# Patient Record
Sex: MALE | Race: WHITE | NOT HISPANIC OR LATINO
[De-identification: names, ages, dates, MRNs, and addresses within clinical notes are randomized per-mention and may not be internally consistent; named-entity substitution may affect disease eponyms.]

---

## 2017-01-03 ENCOUNTER — RX RENEWAL (OUTPATIENT)
Age: 72
End: 2017-01-03

## 2017-06-13 ENCOUNTER — RX RENEWAL (OUTPATIENT)
Age: 72
End: 2017-06-13

## 2017-09-27 ENCOUNTER — RX RENEWAL (OUTPATIENT)
Age: 72
End: 2017-09-27

## 2017-11-05 ENCOUNTER — RX RENEWAL (OUTPATIENT)
Age: 72
End: 2017-11-05

## 2018-02-11 ENCOUNTER — RX RENEWAL (OUTPATIENT)
Age: 73
End: 2018-02-11

## 2018-04-25 ENCOUNTER — RX RENEWAL (OUTPATIENT)
Age: 73
End: 2018-04-25

## 2018-08-05 ENCOUNTER — RX RENEWAL (OUTPATIENT)
Age: 73
End: 2018-08-05

## 2018-10-30 ENCOUNTER — RX RENEWAL (OUTPATIENT)
Age: 73
End: 2018-10-30

## 2021-08-17 ENCOUNTER — APPOINTMENT (OUTPATIENT)
Dept: FAMILY MEDICINE | Facility: CLINIC | Age: 76
End: 2021-08-17
Payer: COMMERCIAL

## 2021-08-17 VITALS
OXYGEN SATURATION: 98 % | TEMPERATURE: 97.3 F | WEIGHT: 268 LBS | BODY MASS INDEX: 35.52 KG/M2 | SYSTOLIC BLOOD PRESSURE: 122 MMHG | HEART RATE: 74 BPM | DIASTOLIC BLOOD PRESSURE: 80 MMHG | HEIGHT: 73 IN

## 2021-08-17 DIAGNOSIS — M48.07 SPINAL STENOSIS, LUMBOSACRAL REGION: ICD-10-CM

## 2021-08-17 DIAGNOSIS — M54.16 RADICULOPATHY, LUMBAR REGION: ICD-10-CM

## 2021-08-17 DIAGNOSIS — J06.9 ACUTE UPPER RESPIRATORY INFECTION, UNSPECIFIED: ICD-10-CM

## 2021-08-17 DIAGNOSIS — G89.29 OTHER CHRONIC PAIN: ICD-10-CM

## 2021-08-17 PROCEDURE — 99204 OFFICE O/P NEW MOD 45 MIN: CPT

## 2021-08-17 RX ORDER — AZITHROMYCIN 250 MG/1
250 TABLET, FILM COATED ORAL
Qty: 6 | Refills: 0 | Status: ACTIVE | COMMUNITY
Start: 2021-08-17 | End: 1900-01-01

## 2021-08-17 NOTE — PHYSICAL EXAM
[Normal] : no respiratory distress, lungs were clear to auscultation bilaterally and no accessory muscle use [de-identified] : post nasal drip [de-identified] : spinal tenderness, positive slr

## 2021-08-17 NOTE — REVIEW OF SYSTEMS
[Postnasal Drip] : postnasal drip [Nasal Discharge] : nasal discharge [Sore Throat] : sore throat [Cough] : cough [Negative] : Neurological [Hearing Loss] : no hearing loss [Nosebleeds] : no nosebleeds [Hoarseness] : no hoarseness [FreeTextEntry6] : non-productive [de-identified] : see hpi [FreeTextEntry9] : see hpi

## 2021-08-17 NOTE — HISTORY OF PRESENT ILLNESS
[FreeTextEntry8] : Patient with severe sciatica, underwent extensive surgery in 2020, which resulted in some relief.\par also has spinal stenosis, has been going to  PT, has gotten epidurals, acupuncture, medical massage, sent by dr. bravo. sees vinod torrez for primary care\par right sided radicular  pain persists. Had left THR a few weeks ago, is doing well with that surgery\par also, has had cough times a week, positive post nasal drip. denies, fever, chills, muscle aches, sob. has had 2 covid shots

## 2021-08-19 LAB — SARS-COV-2 N GENE NPH QL NAA+PROBE: NOT DETECTED

## 2022-08-26 ENCOUNTER — APPOINTMENT (OUTPATIENT)
Dept: ORTHOPEDIC SURGERY | Facility: CLINIC | Age: 77
End: 2022-08-26

## 2022-08-26 VITALS — BODY MASS INDEX: 37.25 KG/M2 | HEIGHT: 72 IN | WEIGHT: 275 LBS

## 2022-08-26 DIAGNOSIS — M11.261 OTHER CHONDROCALCINOSIS, RIGHT KNEE: ICD-10-CM

## 2022-08-26 DIAGNOSIS — M17.11 UNILATERAL PRIMARY OSTEOARTHRITIS, RIGHT KNEE: ICD-10-CM

## 2022-08-26 DIAGNOSIS — M25.461 EFFUSION, RIGHT KNEE: ICD-10-CM

## 2022-08-26 PROCEDURE — 99214 OFFICE O/P EST MOD 30 MIN: CPT

## 2022-08-26 PROCEDURE — 73562 X-RAY EXAM OF KNEE 3: CPT | Mod: RT

## 2022-08-26 NOTE — IMAGING
[de-identified] : \par Left knee exam: \par \par Skin: no significant pertinent finding\par Inspection:  +pitting edema R>L leg\par    mild Effusion\par    (-) Malalignment\par    (-) Swelling\par    (-) Quad atrophy\par    (-) J-sign\par ROM: \par    0 - 120 degrees of flexion.\par Tenderness: \par    (-) MJLT\par    (-) LJLT\par    (-) Medial patellar facet tenderness\par    (-) Lateral patellar facet tenderness\par    (-) Crepitus\par    (-) Patellar grind tenderness\par    (-) Patellar tendon\par    (-) Quad tendon\par    Other: \par Stability: \par    (-) Lachman\par    (-) Varus/Valgus instability\par    (-) Posterior drawer\par    (-) Patellar translation: wnl\par Additional tests: \par    (-) McMurrays test\par    (-) Patellar apprehension\par    Other: \par Strength: foot drop  R \par Vascularity: Extremity warm and well perfused\par Gait: normal.\par \par ------------------------------------------------------------------------------------------------------------------------------------------------------\par  [Right] : right knee [All Views] : anteroposterior, lateral, skyline, and anteroposterior standing [Moderate tricompartmental OA medial narrowing] : Moderate tricompartmental OA medial narrowing [FreeTextEntry9] : chondrocalcinosis, medial joint space narrowing, pf djd.

## 2022-08-26 NOTE — HISTORY OF PRESENT ILLNESS
[Right Leg] : right leg [Gradual] : gradual [0] : 0 [Dull/Aching] : dull/aching [Occasional] : occasional [Stairs] : stairs [de-identified] : This is Mr. LISSY GILLETTE  a 77 year old male who comes in today complaining of right knee pain for a while.  HX of unsucessful lumbar surgery which caused a dropped right foot.  Also had bunionectomy on right foot 6/3/22.  notes he has some recurrent fluid in the right knee at times. pain is not very significant. 'creaking' with stairs. notes he recently had negative doppler\par \par HX of Left TKA in 2014 [] : no [de-identified] : urgent care

## 2022-08-26 NOTE — DISCUSSION/SUMMARY
[de-identified] : clinically asymptomatic knee effusion from OA and pseudogout. discused options. will observe for now as he does not have any significant dysfunction. \par \par ------------------------------------------------------------------------------------------------------------------------------------------------------\par \par The patient was advised of the diagnosis.  The natural history of the pathology was explained in full. All questions were answered.  The risks and benefits of conservative and interventional treatment alternatives were explained to the patient\par \par ------------------------------------------------------------------------------------------------------------------------------------------------------\par

## 2022-10-24 ENCOUNTER — APPOINTMENT (OUTPATIENT)
Dept: ORTHOPEDIC SURGERY | Facility: CLINIC | Age: 77
End: 2022-10-24

## 2022-10-24 VITALS — BODY MASS INDEX: 35.78 KG/M2 | HEIGHT: 73 IN | WEIGHT: 270 LBS

## 2022-10-24 DIAGNOSIS — E11.9 TYPE 2 DIABETES MELLITUS W/OUT COMPLICATIONS: ICD-10-CM

## 2022-10-24 DIAGNOSIS — G62.9 POLYNEUROPATHY, UNSPECIFIED: ICD-10-CM

## 2022-10-24 DIAGNOSIS — Z00.00 ENCOUNTER FOR GENERAL ADULT MEDICAL EXAMINATION W/OUT ABNORMAL FINDINGS: ICD-10-CM

## 2022-10-24 DIAGNOSIS — E78.00 PURE HYPERCHOLESTEROLEMIA, UNSPECIFIED: ICD-10-CM

## 2022-10-24 DIAGNOSIS — Z87.39 PERSONAL HISTORY OF OTHER DISEASES OF THE MUSCULOSKELETAL SYSTEM AND CONNECTIVE TISSUE: ICD-10-CM

## 2022-10-24 DIAGNOSIS — I10 ESSENTIAL (PRIMARY) HYPERTENSION: ICD-10-CM

## 2022-10-24 DIAGNOSIS — R29.898 OTHER SYMPTOMS AND SIGNS INVOLVING THE MUSCULOSKELETAL SYSTEM: ICD-10-CM

## 2022-10-24 PROCEDURE — 73630 X-RAY EXAM OF FOOT: CPT | Mod: LT

## 2022-10-24 PROCEDURE — 99214 OFFICE O/P EST MOD 30 MIN: CPT

## 2022-10-24 NOTE — HISTORY OF PRESENT ILLNESS
[de-identified] : Patient presents for evaluation of his LT foot/3rd toe. He has a history of neuropathy. Denies trauma. Symptoms since July/August 2022. He has a history of right ankle/foot OA and right foot drop (secondary to lumbar surgery). He wears a dorsiflex assist AFO interchangeably with an OTC brace depending on shoes he uses. Ambulating with crutches due to chronic lumbar spine issue. He reports burning sensation especially in his third toe, chiefly at night.

## 2022-10-24 NOTE — ASSESSMENT
[FreeTextEntry1] : His symptoms seem to be consistent with neuropathy.\par \par He is currently taking Gabapentin as prescribed by his nephrologist. He sees them tomorrow and will discuss this further.

## 2022-10-24 NOTE — PHYSICAL EXAM
[NL (40)] : plantar flexion 40 degrees [NL 30)] : inversion 30 degrees [5___] : [facility] 5[unfilled]/5 [2+] : posterior tibialis pulse: 2+ [2nd] : 2nd [3rd] : 3rd [Moderate] : moderate diffused ankle swelling [4___] : eversion 4[unfilled]/5 [Decreased] : saphenous nerve sensation decreased [] : no pain when stressing lateral tarsal metatarsal joint [Left] : left foot [Weight -] : weightbearing [FreeTextEntry3] : PItting edema of lower leg and ankle. Lateral deviation 2nd/3rd toes.  [de-identified] : Hallux valgus, hammer toes and lateral deviation 2nd/3rd toes.  [de-identified] : eversion 15 degrees [TWNoteComboBox7] : dorsiflexion 15 degrees

## 2023-09-26 ENCOUNTER — APPOINTMENT (OUTPATIENT)
Dept: ENDOCRINOLOGY | Facility: CLINIC | Age: 78
End: 2023-09-26
Payer: COMMERCIAL

## 2023-09-26 VITALS
OXYGEN SATURATION: 99 % | TEMPERATURE: 97.4 F | SYSTOLIC BLOOD PRESSURE: 128 MMHG | WEIGHT: 301.06 LBS | BODY MASS INDEX: 39.9 KG/M2 | HEART RATE: 72 BPM | HEIGHT: 73 IN | DIASTOLIC BLOOD PRESSURE: 72 MMHG

## 2023-09-26 LAB
GLUCOSE BLDC GLUCOMTR-MCNC: 364
HBA1C MFR BLD HPLC: 8.8

## 2023-09-26 PROCEDURE — 83036 HEMOGLOBIN GLYCOSYLATED A1C: CPT | Mod: QW

## 2023-09-26 PROCEDURE — 82962 GLUCOSE BLOOD TEST: CPT

## 2023-09-26 PROCEDURE — 99204 OFFICE O/P NEW MOD 45 MIN: CPT

## 2023-09-26 RX ORDER — GLUCOSAM/CHON-MSM1/C/MANG/BOSW 500-416.6
TABLET ORAL
Qty: 1 | Refills: 0 | Status: ACTIVE | COMMUNITY
Start: 2023-09-26 | End: 1900-01-01

## 2023-09-26 RX ORDER — BLOOD-GLUCOSE METER
70 EACH MISCELLANEOUS
Qty: 3 | Refills: 3 | Status: ACTIVE | COMMUNITY
Start: 2023-09-26 | End: 1900-01-01

## 2023-09-26 RX ORDER — BLOOD SUGAR DIAGNOSTIC
STRIP MISCELLANEOUS
Qty: 270 | Refills: 2 | Status: ACTIVE | COMMUNITY
Start: 2023-09-26 | End: 1900-01-01

## 2023-09-26 RX ORDER — LANCETS 33 GAUGE
EACH MISCELLANEOUS
Qty: 360 | Refills: 2 | Status: ACTIVE | COMMUNITY
Start: 2023-09-26 | End: 1900-01-01

## 2023-09-26 RX ORDER — PEN NEEDLE, DIABETIC 29 G X1/2"
32G X 4 MM NEEDLE, DISPOSABLE MISCELLANEOUS
Qty: 4 | Refills: 2 | Status: ACTIVE | COMMUNITY
Start: 2023-09-26 | End: 1900-01-01

## 2023-12-12 ENCOUNTER — APPOINTMENT (OUTPATIENT)
Dept: ENDOCRINOLOGY | Facility: CLINIC | Age: 78
End: 2023-12-12
Payer: COMMERCIAL

## 2023-12-12 ENCOUNTER — RESULT CHARGE (OUTPATIENT)
Age: 78
End: 2023-12-12

## 2023-12-12 VITALS
RESPIRATION RATE: 16 BRPM | TEMPERATURE: 98 F | DIASTOLIC BLOOD PRESSURE: 64 MMHG | OXYGEN SATURATION: 97 % | HEIGHT: 73 IN | SYSTOLIC BLOOD PRESSURE: 118 MMHG | WEIGHT: 276 LBS | HEART RATE: 69 BPM | BODY MASS INDEX: 36.58 KG/M2

## 2023-12-12 DIAGNOSIS — Z79.4 LONG TERM (CURRENT) USE OF INSULIN: ICD-10-CM

## 2023-12-12 DIAGNOSIS — E66.9 OBESITY, UNSPECIFIED: ICD-10-CM

## 2023-12-12 DIAGNOSIS — E11.9 TYPE 2 DIABETES MELLITUS W/OUT COMPLICATIONS: ICD-10-CM

## 2023-12-12 LAB
GLUCOSE BLDC GLUCOMTR-MCNC: 89
HBA1C MFR BLD HPLC: 6.6

## 2023-12-12 PROCEDURE — 99214 OFFICE O/P EST MOD 30 MIN: CPT

## 2024-01-15 ENCOUNTER — RX RENEWAL (OUTPATIENT)
Age: 79
End: 2024-01-15

## 2024-01-16 ENCOUNTER — NON-APPOINTMENT (OUTPATIENT)
Age: 79
End: 2024-01-16

## 2024-04-06 ENCOUNTER — EMERGENCY (EMERGENCY)
Facility: HOSPITAL | Age: 79
LOS: 1 days | Discharge: HOME | End: 2024-04-06
Attending: EMERGENCY MEDICINE
Payer: COMMERCIAL

## 2024-04-06 VITALS
HEART RATE: 56 BPM | DIASTOLIC BLOOD PRESSURE: 78 MMHG | SYSTOLIC BLOOD PRESSURE: 150 MMHG | TEMPERATURE: 98 F | HEIGHT: 77 IN | WEIGHT: 265 LBS | RESPIRATION RATE: 18 BRPM | OXYGEN SATURATION: 97 %

## 2024-04-06 VITALS
SYSTOLIC BLOOD PRESSURE: 152 MMHG | DIASTOLIC BLOOD PRESSURE: 67 MMHG | HEART RATE: 68 BPM | OXYGEN SATURATION: 98 % | TEMPERATURE: 99 F | RESPIRATION RATE: 18 BRPM

## 2024-04-06 PROCEDURE — 99283 EMERGENCY DEPT VISIT LOW MDM: CPT

## 2024-04-06 PROCEDURE — 99284 EMERGENCY DEPT VISIT MOD MDM: CPT

## 2024-04-06 RX ORDER — ERYTHROMYCIN BASE 5 MG/GRAM
1 OINTMENT (GRAM) OPHTHALMIC (EYE)
Qty: 1 | Refills: 0
Start: 2024-04-06 | End: 2024-04-12

## 2024-04-06 RX ORDER — ERYTHROMYCIN BASE 5 MG/GRAM
1 OINTMENT (GRAM) OPHTHALMIC (EYE) ONCE
Refills: 0 | Status: COMPLETED | OUTPATIENT
Start: 2024-04-06 | End: 2024-04-06

## 2024-04-06 RX ADMIN — Medication 1 APPLICATION(S): at 18:54

## 2024-04-06 NOTE — ED PROVIDER NOTE - PROGRESS NOTE DETAILS
Adriel SCOTT), PGY-1: spoke with ophthalmology, discussed clinical presentation and exam, sent photo of eye with permission from patient, ophthalmology recommends outpatient follow up within 1wk for likely subconjunctival hemorrhage, recommending erythromycin ointment and artificial tears.

## 2024-04-06 NOTE — ED ADULT NURSE NOTE - OBJECTIVE STATEMENT
Pt 78 year old male, A/O x4. Pt came in due ot left eye bleeding. PMH- Afib (takes Xarelto), HTN, DM2, cataracts, right eye completely blind. . As per pt, he noticed bleeding yesterday. Unknown how bleeding occurred. Upon assessment, eye currently bleeding an dpt continues to dab area with tissue with residual blood. Denies ha, dizziness, vision changes, pain, n/v/d, numbness/ tingling.

## 2024-04-06 NOTE — ED PROVIDER NOTE - PHYSICAL EXAMINATION
Const: Awake, alert, no acute distress.  Well appearing.  Moving comfortably on stretcher.  HEENT: NC/AT.  Moist mucous membranes.  Eyes: L eye with intact extraocular movements, pupil 3mm and reactive to light, no hyphema, area of subconjunctival hemorrhage to upper nasal conjunctiva.  L eye visual acuity 20/40.  No abrasions seen on woods lamp examination.  No scleral icterus.  R eye with decreased ROM and blindness (chronic).  Neck: Full ROM without pain.  Cardiac: Extremities well perfused, normal coloration, no peripheral cyanosis.  No peripheral edema.  S1 S2 present.  Resp: Speaking in full sentences.  No evidence of respiratory distress.  breath sounds clear to auscultation b/l.  Abd: Non-distended.  Skin: Normal coloration.  No rashes, abrasions or lacerations.  Neuro: Awake, alert & oriented x 3.  Moves all extremities symmetrically.  No obvious focal deficits.

## 2024-04-06 NOTE — ED ADULT TRIAGE NOTE - CHIEF COMPLAINT QUOTE
"I noticed yesterday that my left eye is bleeding. I am not sure if I scratched my eyelid but it is still bleeding since yesterday. I went to urgent care and they told me to come here"  not active bleeding at this moment

## 2024-04-06 NOTE — ED PROVIDER NOTE - OBJECTIVE STATEMENT
78-year-old M patient with history HTN, HLD, DM on insulin, A-fib on Xarelto and verapamil, presenting to the ED for evaluation of left eye bleeding since last night.  Patient states last night at approximately 5 PM he noticed tears of blood coming from his left eye, and has had bleeding since then.  No recent eye trauma or falls or injuries.  Denies fevers, chills, blurred vision, eye pain, nausea, vomiting, diarrhea, headaches, numbness, tingling, ear pain, tinnitus, nasal congestion, cough, throat pain.

## 2024-04-06 NOTE — ED ADULT NURSE NOTE - CHPI ED NUR SYMPTOMS NEG
no blurred vision/no double vision/no eye lid swelling/no foreign body/no itching/no pain/no photophobia/no purulent drainage

## 2024-04-06 NOTE — ED PROVIDER NOTE - CLINICAL SUMMARY MEDICAL DECISION MAKING FREE TEXT BOX
Yovany: 78 year old male with pmhx of afib on xarelto, blind in R eye, here with bleeding from left eye yesterday. no scratches, started at evening. wipes it and reaccumulates to bottom eye.  no tears.   PE: alert, nad, nonlabored respirations, + s1s2.left eye PEERL, left eomi, + 11'oclock subconjuctival hemorrhage producing bleeding tears (not from lacrimal duct), blind in right eye, left eye 20/50, fluroscein uptake at the hemorrhage, no FU in cornea, no hyphema, alert and oriented x 3, no focal deficits, normal gait.

## 2024-04-06 NOTE — ED PROVIDER NOTE - PATIENT PORTAL LINK FT
You can access the FollowMyHealth Patient Portal offered by [facility] by registering at the following website: [email or web address]. By joining [employer]’s FollowMyHealth portal, you will also be able to view your health information using other applications (apps) compatible with our system.

## 2024-04-06 NOTE — ED PROVIDER NOTE - NSFOLLOWUPINSTRUCTIONS_ED_ALL_ED_FT
You were seen in the emergency department for evaluation of bleeding from the left eye, we examined the eye, and sent a prescription to your pharmacy.    We sent a prescription for erythromycin ointment (antibiotic) to your pharmacy, please apply this cream to the left thigh once a day at night for the next 7 days.  Additionally, please use artificial tears several times throughout the day to lubricate the eye.    As discussed, please follow-up with your ophthalmologist for continued evaluation of subconjunctival hemorrhage.  If you have any of the following symptoms please return to the emergency department immediately:  blurred vision, loss of vision, flashes or floaters in the visual field, pain in the eye, pain with movement of the eye, fevers, chills, nausea, vomiting.    Please RETURN TO THE EMERGENCY DEPARTMENT OR SEEK IMMEDIATE MEDICAL ATTENTION if you experience any new or worsening symptoms, including but not limited to: fevers, chills, persistent nausea or vomiting or diarrhea, significant fatigue, significantly decreased physical activity, inability to stand or walk on your own, inability to hold down foods or fluids, fainting, chest pain, shortness of breath, bloody urine, coughing up or throwing up blood, bloody stools.

## 2024-04-06 NOTE — ED ADULT NURSE NOTE - NSFALLUNIVINTERV_ED_ALL_ED
Bed/Stretcher in lowest position, wheels locked, appropriate side rails in place/Call bell, personal items and telephone in reach/Instruct patient to call for assistance before getting out of bed/chair/stretcher/Non-slip footwear applied when patient is off stretcher/Jay Em to call system/Physically safe environment - no spills, clutter or unnecessary equipment/Purposeful proactive rounding/Room/bathroom lighting operational, light cord in reach

## 2024-04-12 ENCOUNTER — APPOINTMENT (OUTPATIENT)
Dept: ENDOCRINOLOGY | Facility: CLINIC | Age: 79
End: 2024-04-12

## 2024-04-22 RX ORDER — SEMAGLUTIDE 1.34 MG/ML
4 INJECTION, SOLUTION SUBCUTANEOUS
Qty: 3 | Refills: 0 | Status: ACTIVE | COMMUNITY
Start: 2023-09-26 | End: 1900-01-01

## 2024-07-08 ENCOUNTER — APPOINTMENT (OUTPATIENT)
Dept: ENDOCRINOLOGY | Facility: CLINIC | Age: 79
End: 2024-07-08
Payer: COMMERCIAL

## 2024-07-08 VITALS
WEIGHT: 270.5 LBS | BODY MASS INDEX: 35.85 KG/M2 | HEIGHT: 73 IN | HEART RATE: 72 BPM | DIASTOLIC BLOOD PRESSURE: 78 MMHG | SYSTOLIC BLOOD PRESSURE: 123 MMHG | OXYGEN SATURATION: 98 %

## 2024-07-08 DIAGNOSIS — Z79.4 LONG TERM (CURRENT) USE OF INSULIN: ICD-10-CM

## 2024-07-08 DIAGNOSIS — E66.9 OBESITY, UNSPECIFIED: ICD-10-CM

## 2024-07-08 DIAGNOSIS — E11.9 TYPE 2 DIABETES MELLITUS W/OUT COMPLICATIONS: ICD-10-CM

## 2024-07-08 LAB
GLUCOSE BLDC GLUCOMTR-MCNC: 85
HBA1C MFR BLD HPLC: 6.5

## 2024-07-08 PROCEDURE — 99214 OFFICE O/P EST MOD 30 MIN: CPT

## 2024-07-08 PROCEDURE — 95250 CONT GLUC MNTR PHYS/QHP EQP: CPT

## 2024-07-08 PROCEDURE — 83036 HEMOGLOBIN GLYCOSYLATED A1C: CPT | Mod: QW

## 2024-07-08 RX ORDER — BLOOD-GLUCOSE SENSOR
EACH MISCELLANEOUS
Qty: 9 | Refills: 3 | Status: ACTIVE | COMMUNITY
Start: 2024-07-08 | End: 1900-01-01

## 2024-11-16 ENCOUNTER — INPATIENT (INPATIENT)
Facility: HOSPITAL | Age: 79
LOS: 1 days | Discharge: HOME | End: 2024-11-18
Attending: HOSPITALIST | Admitting: HOSPITALIST
Payer: COMMERCIAL

## 2024-11-16 VITALS — RESPIRATION RATE: 19 BRPM | WEIGHT: 274.92 LBS | TEMPERATURE: 98 F | HEIGHT: 73 IN

## 2024-11-16 LAB
ALBUMIN SERPL ELPH-MCNC: 2.8 G/DL — LOW (ref 3.3–5)
ALP SERPL-CCNC: 43 U/L — SIGNIFICANT CHANGE UP (ref 40–120)
ALT FLD-CCNC: 25 U/L — SIGNIFICANT CHANGE UP (ref 12–78)
ANION GAP SERPL CALC-SCNC: 5 MMOL/L — SIGNIFICANT CHANGE UP (ref 5–17)
APTT BLD: 40.6 SEC — HIGH (ref 24.5–35.6)
AST SERPL-CCNC: 37 U/L — SIGNIFICANT CHANGE UP (ref 15–37)
BASOPHILS # BLD AUTO: 0.05 K/UL — SIGNIFICANT CHANGE UP (ref 0–0.2)
BASOPHILS NFR BLD AUTO: 0.8 % — SIGNIFICANT CHANGE UP (ref 0–2)
BILIRUB SERPL-MCNC: 0.7 MG/DL — SIGNIFICANT CHANGE UP (ref 0.2–1.2)
BUN SERPL-MCNC: 69 MG/DL — HIGH (ref 7–23)
CALCIUM SERPL-MCNC: 8.5 MG/DL — SIGNIFICANT CHANGE UP (ref 8.5–10.1)
CHLORIDE SERPL-SCNC: 112 MMOL/L — HIGH (ref 96–108)
CO2 SERPL-SCNC: 26 MMOL/L — SIGNIFICANT CHANGE UP (ref 22–31)
CREAT SERPL-MCNC: 4.07 MG/DL — HIGH (ref 0.5–1.3)
EGFR: 14 ML/MIN/1.73M2 — LOW
EOSINOPHIL # BLD AUTO: 0.18 K/UL — SIGNIFICANT CHANGE UP (ref 0–0.5)
EOSINOPHIL NFR BLD AUTO: 2.8 % — SIGNIFICANT CHANGE UP (ref 0–6)
FLUAV AG NPH QL: SIGNIFICANT CHANGE UP
FLUBV AG NPH QL: SIGNIFICANT CHANGE UP
GLUCOSE BLDC GLUCOMTR-MCNC: 116 MG/DL — HIGH (ref 70–99)
GLUCOSE SERPL-MCNC: 153 MG/DL — HIGH (ref 70–99)
HCT VFR BLD CALC: 32.2 % — LOW (ref 39–50)
HGB BLD-MCNC: 10.3 G/DL — LOW (ref 13–17)
IMM GRANULOCYTES NFR BLD AUTO: 0.3 % — SIGNIFICANT CHANGE UP (ref 0–0.9)
INR BLD: 1.54 RATIO — HIGH (ref 0.85–1.16)
LYMPHOCYTES # BLD AUTO: 0.77 K/UL — LOW (ref 1–3.3)
LYMPHOCYTES # BLD AUTO: 12.2 % — LOW (ref 13–44)
MAGNESIUM SERPL-MCNC: 2 MG/DL — SIGNIFICANT CHANGE UP (ref 1.6–2.6)
MCHC RBC-ENTMCNC: 30.1 PG — SIGNIFICANT CHANGE UP (ref 27–34)
MCHC RBC-ENTMCNC: 32 G/DL — SIGNIFICANT CHANGE UP (ref 32–36)
MCV RBC AUTO: 94.2 FL — SIGNIFICANT CHANGE UP (ref 80–100)
MONOCYTES # BLD AUTO: 0.54 K/UL — SIGNIFICANT CHANGE UP (ref 0–0.9)
MONOCYTES NFR BLD AUTO: 8.5 % — SIGNIFICANT CHANGE UP (ref 2–14)
NEUTROPHILS # BLD AUTO: 4.76 K/UL — SIGNIFICANT CHANGE UP (ref 1.8–7.4)
NEUTROPHILS NFR BLD AUTO: 75.4 % — SIGNIFICANT CHANGE UP (ref 43–77)
NRBC # BLD: 0 /100 WBCS — SIGNIFICANT CHANGE UP (ref 0–0)
NT-PROBNP SERPL-SCNC: 9582 PG/ML — HIGH (ref 0–450)
PLATELET # BLD AUTO: 123 K/UL — LOW (ref 150–400)
POTASSIUM SERPL-MCNC: 4.5 MMOL/L — SIGNIFICANT CHANGE UP (ref 3.5–5.3)
POTASSIUM SERPL-SCNC: 4.5 MMOL/L — SIGNIFICANT CHANGE UP (ref 3.5–5.3)
PROT SERPL-MCNC: 6.9 GM/DL — SIGNIFICANT CHANGE UP (ref 6–8.3)
PROTHROM AB SERPL-ACNC: 17.3 SEC — HIGH (ref 9.9–13.4)
RBC # BLD: 3.42 M/UL — LOW (ref 4.2–5.8)
RBC # FLD: 14.8 % — HIGH (ref 10.3–14.5)
RSV RNA NPH QL NAA+NON-PROBE: SIGNIFICANT CHANGE UP
SARS-COV-2 RNA SPEC QL NAA+PROBE: SIGNIFICANT CHANGE UP
SODIUM SERPL-SCNC: 143 MMOL/L — SIGNIFICANT CHANGE UP (ref 135–145)
TROPONIN I, HIGH SENSITIVITY RESULT: 39 NG/L — SIGNIFICANT CHANGE UP
WBC # BLD: 6.32 K/UL — SIGNIFICANT CHANGE UP (ref 3.8–10.5)
WBC # FLD AUTO: 6.32 K/UL — SIGNIFICANT CHANGE UP (ref 3.8–10.5)

## 2024-11-16 PROCEDURE — 71250 CT THORAX DX C-: CPT | Mod: 26

## 2024-11-16 PROCEDURE — 71046 X-RAY EXAM CHEST 2 VIEWS: CPT | Mod: 26

## 2024-11-16 PROCEDURE — 99285 EMERGENCY DEPT VISIT HI MDM: CPT

## 2024-11-16 PROCEDURE — 76775 US EXAM ABDO BACK WALL LIM: CPT | Mod: 26

## 2024-11-16 PROCEDURE — 93010 ELECTROCARDIOGRAM REPORT: CPT

## 2024-11-16 PROCEDURE — 99223 1ST HOSP IP/OBS HIGH 75: CPT

## 2024-11-16 RX ORDER — HYDRALAZINE HYDROCHLORIDE 10 MG/1
5 TABLET ORAL ONCE
Refills: 0 | Status: COMPLETED | OUTPATIENT
Start: 2024-11-16 | End: 2024-11-16

## 2024-11-16 RX ORDER — TAMSULOSIN HYDROCHLORIDE 0.4 MG/1
0.4 CAPSULE ORAL AT BEDTIME
Refills: 0 | Status: DISCONTINUED | OUTPATIENT
Start: 2024-11-16 | End: 2024-11-18

## 2024-11-16 RX ORDER — FUROSEMIDE 40 MG/1
60 TABLET ORAL DAILY
Refills: 0 | Status: DISCONTINUED | OUTPATIENT
Start: 2024-11-16 | End: 2024-11-18

## 2024-11-16 RX ORDER — CEFTRIAXONE SODIUM 1 G
1000 VIAL (EA) INJECTION EVERY 24 HOURS
Refills: 0 | Status: DISCONTINUED | OUTPATIENT
Start: 2024-11-16 | End: 2024-11-17

## 2024-11-16 RX ORDER — FENOFIBRATE,MICRONIZED 134 MG
145 CAPSULE ORAL DAILY
Refills: 0 | Status: DISCONTINUED | OUTPATIENT
Start: 2024-11-16 | End: 2024-11-16

## 2024-11-16 RX ORDER — RIVAROXABAN 10 MG/1
15 TABLET, FILM COATED ORAL DAILY
Refills: 0 | Status: DISCONTINUED | OUTPATIENT
Start: 2024-11-16 | End: 2024-11-18

## 2024-11-16 RX ORDER — LISINOPRIL 20 MG/1
20 TABLET ORAL DAILY
Refills: 0 | Status: DISCONTINUED | OUTPATIENT
Start: 2024-11-16 | End: 2024-11-18

## 2024-11-16 RX ORDER — METOPROLOL TARTRATE 100 MG/1
100 TABLET, FILM COATED ORAL DAILY
Refills: 0 | Status: DISCONTINUED | OUTPATIENT
Start: 2024-11-16 | End: 2024-11-18

## 2024-11-16 RX ORDER — DOXAZOSIN MESYLATE 1 MG
4 TABLET ORAL AT BEDTIME
Refills: 0 | Status: DISCONTINUED | OUTPATIENT
Start: 2024-11-16 | End: 2024-11-18

## 2024-11-16 RX ADMIN — TAMSULOSIN HYDROCHLORIDE 0.4 MILLIGRAM(S): 0.4 CAPSULE ORAL at 22:02

## 2024-11-16 RX ADMIN — Medication 10 MILLIGRAM(S): at 22:02

## 2024-11-16 RX ADMIN — RIVAROXABAN 15 MILLIGRAM(S): 10 TABLET, FILM COATED ORAL at 18:42

## 2024-11-16 RX ADMIN — Medication 100 MILLIGRAM(S): at 18:25

## 2024-11-16 RX ADMIN — Medication 4 MILLIGRAM(S): at 22:01

## 2024-11-16 RX ADMIN — HYDRALAZINE HYDROCHLORIDE 5 MILLIGRAM(S): 10 TABLET ORAL at 23:51

## 2024-11-16 NOTE — ED PROVIDER NOTE - CARE PLAN
Principal Discharge DX:	Dyspnea   1 Principal Discharge DX:	Dyspnea  Secondary Diagnosis:	New onset of congestive heart failure

## 2024-11-16 NOTE — ED PROVIDER NOTE - NS ED MD DISPO SPECIAL CONSIDERATION1
----- Message from HOLDEN Beasley MD sent at 5/30/2023 10:59 AM CDT -----  Regarding: RE: Plan  Jun Alicia,    Happy to evaluate her and see her in clinic ASAP.    C, please add to clinic ASAP.    Pavel Hinds  ----- Message -----  From: Ravin Bartlett MD  Sent: 5/30/2023   8:51 AM CDT  To: HOLDEN Beasley MD; #  Subject: RE: Plan                                         Jun Hinds. 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 Over the past 6 months, she has had a chest wall infection with a draining sinus tract. I don't think she has sarcoma recurrence since this has been going on for so long-but obviously never certain. Do you think she needs a debridement? Would you like to see her for a repeat evaluation? Thanks. T  ----- Message -----  From: Mona Chatman PA-C  Sent: 5/26/2023   9:38 AM CDT  To: Ravin Bartlett MD; Rosanna Whitmore, SANGEETA  Subject: RE: Plan                                         Jun Marte and Dr. Bartlett,    I would like to follow-up with this patient with our next steps for treating this draining sinus tract that probes to her costochondral junction and greatly appreciate your input. Typically if we suspect an infectious process of bone or joint we try to get patients set up with a surgeon for consideration of deep debridement and cultures vs other treatments. I can certainly refer her to CT surgery for consult, but if there is sarcoma suspected I would assume oncology surgery would be the preferred route? She has expressed to me that she desires resolution of this sinus tract, however at this point it's unclear to me what that will all entail as the etiology is a  bit murky. Please let me know your thoughts.    Robert,    Padmini  [phone] cell  [phone] office   ----- Message -----  From: Rosanna Whitmore, RN  Sent: 5/24/2023   3:00 PM CDT  To: Ravin Bartlett MD; #  Subject: RE: Plan                                          Jun Washington -     I am not sure if I am comfortable saying there is no concern for cancer so I have included Dr. Bartlett on this message so he can clarify.     Sorry I couldn't be of more help.    Rosanna Pierre       ----- Message -----  From: Mona Chatman PA-C  Sent: 5/24/2023   2:52 PM CDT  To: Rosanna Whitmore RN  Subject: RE: Plan                                         Thanks for following up! So to clarify - no concern for cancer? I'm trying to figure out etiology of this draining sinus tract so we can treat it.     Padmini    ----- Message -----  From: Rosanna Whitmore RN  Sent: 5/23/2023  11:51 AM CDT  To: Mona Chatman PA-C  Subject: Consuelo Lynch,     Dr. Bartlett reviewed Tiffanie's MRI. He does not feel the need to see Tiffanie unless any future imaging looks worse or she has pain/mass.     Please let me know if there is anything additional I can do to help.     Rosnana Pierre, [facility]  Surgical Oncology   Ph: [phone]                       None

## 2024-11-16 NOTE — CONSULT NOTE ADULT - SUBJECTIVE AND OBJECTIVE BOX
[facility] NEPHROLOGY SERVICES, [facility]  NEPHROLOGY AND HYPERTENSION  [address]  [address]  [place] [address]  [phone]    MD TARA RICK MD YELENA ROSENBERG, MD BINNY KOSHY, MD CHRISTOPHER CAPUTO, MD EDWARD BOVER, MD      Information from chart:  "Patient is a 79y old  Male who presents with a chief complaint of SOB with LLL infiltrate and bilateral LE edema. (16 Nov 2024 15:22)    HPI:  78 y/o male PMH A. fib on Xarelto, DM, HTN, HLD, CKD V with baseline creatinine 3.95 presents with dyspnea for few days. Pt reports having shortness of breath with exertion for few days. Also has orthopnea. Denies fever, cough, chest pain,  vomiting, abdominal pain. Pt went to  had an xray showing possible CAP vs left pleural effusion and told to come to the ED. Pt reports having mild bilateral leg swelling. Denies recent travel, sick contact. Pt denies history of known CHF, BNP elevated in ER at 9,582.Has CKD V   at baseline and is follow by Nephrologist as outpatient.   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  Cr 3.9 baseline     PAST MEDICAL & SURGICAL HISTORY:  Afib      Diabetes mellitus      Hypertension      High cholesterol        FAMILY HISTORY:    Allergies    No Known Allergies    Intolerances      Home Medications:    MEDICATIONS  (STANDING):  atorvastatin 10 milliGRAM(s) Oral at bedtime  cefTRIAXone   IVPB 1000 milliGRAM(s) IV Intermittent every 24 hours  doxazosin 4 milliGRAM(s) Oral at bedtime  furosemide   Injectable 60 milliGRAM(s) IV Push daily  lisinopril 20 milliGRAM(s) Oral daily  metoprolol succinate  milliGRAM(s) Oral daily  rivaroxaban 15 milliGRAM(s) Oral daily  tamsulosin 0.4 milliGRAM(s) Oral at bedtime    MEDICATIONS  (PRN):    Vital Signs Last 24 Hrs  T(C): 36.3 (16 Nov 2024 16:00), Max: 36.7 (16 Nov 2024 11:42)  T(F): 97.4 (16 Nov 2024 16:00), Max: 98 (16 Nov 2024 11:42)  HR: 76 (16 Nov 2024 16:00) (61 - 76)  BP: 178/99 (16 Nov 2024 16:00) (175/86 - 181/86)  BP(mean): --  RR: 19 (16 Nov 2024 16:00) (19 - 21)  SpO2: 97% (16 Nov 2024 16:00) (94% - 97%)    Parameters below as of 16 Nov 2024 16:00  Patient On (Oxygen Delivery Method): room air        Daily Height in cm: 185.42 (16 Nov 2024 11:42)    Daily     CAPILLARY BLOOD GLUCOSE      POCT Blood Glucose.: 116 mg/dL (16 Nov 2024 18:28)    PHYSICAL EXAM:      T(C): 36.3 (11-16-24 @ 16:00), Max: 36.7 (11-16-24 @ 11:42)  HR: 76 (11-16-24 @ 16:00) (61 - 76)  BP: 178/99 (11-16-24 @ 16:00) (175/86 - 181/86)  RR: 19 (11-16-24 @ 16:00) (19 - 21)  SpO2: 97% (11-16-24 @ 16:00) (94% - 97%)  Wt(kg): --  Lungs clear ant decreased BS at bases   Heart S1S2  Abd soft NT ND  Extremities:   2 edema              11-16    143  |  112[H]  |  69[H]  ----------------------------<  153[H]  4.5   |  26  |  4.07[H]    Ca    8.5      16 Nov 2024 12:50  Mg     2.0     11-16    TPro  6.9  /  Alb  2.8[L]  /  TBili  0.7  /  DBili  x   /  AST  37  /  ALT  25  /  AlkPhos  43  11-16                          10.3   6.32  )-----------( 123      ( 16 Nov 2024 12:50 )             32.2     Creatinine Trend: 4.07<--  Urinalysis Basic - ( 16 Nov 2024 12:50 )    Color: x / Appearance: x / SG: x / pH: x  Gluc: 153 mg/dL / Ketone: x  / Bili: x / Urobili: x   Blood: x / Protein: x / Nitrite: x   Leuk Esterase: x / RBC: x / WBC x   Sq Epi: x / Non Sq Epi: x / Bacteria: x            Assessment   CKD 5; ADPKD; PNA  Component of fluid overload     Plan  IV Ceftriaxone   Maintenance diuretics  Will follow       Raffi Rosen MD

## 2024-11-16 NOTE — ED PROVIDER NOTE - OBJECTIVE STATEMENT
80 y/o male with afib on Xarelto, dm, htn, high chol here with dyspnea x few days. Pt reports having shortness of breath with exertion x few days. Pt also noted some wheezing. Denies fever, cough, chest pain,  vomiting, abdominal pain. Pt went to  had an xray showing pna vs pleural effusion and told to come to the ED. Pt reports having mild leg swelling. Denies recent travel, sick contact. Pt denies history chf. Denies smoking, drinking, drugs.

## 2024-11-16 NOTE — ED PROVIDER NOTE - PHYSICAL EXAMINATION
GEN: Awake, alert, interactive, NAD.  HEAD AND NECK: NC/AT. Airway patent. Neck supple.   EYES:  Clear b/l. EOMI. PERRL.   ENT: Moist mucus membranes.   CARDIAC: Regular rate, regular rhythm. (+) 1 pitting edema.   RESP/CHEST: Normal respiratory effort with no use of accessory muscles or retractions. Clear throughout on auscultation.  ABD: soft, non-distended, non-tender. No rebound, no guarding.   BACK: No midline spinal TTP. No CVAT.   EXTREMITIES: RLE: (+) right foot drop as per patient chronic.  Moving all extremities with no apparent deformities.   SKIN: Warm, dry, intact normal color. No rash.   NEURO: AOx3, CN II-XII grossly intact, no focal deficits.   PSYCH: Appropriate mood and affect.

## 2024-11-16 NOTE — ED PROVIDER NOTE - CLINICAL SUMMARY MEDICAL DECISION MAKING FREE TEXT BOX
78 y/o male with afib on xarleto, dm, htn, high chol here with dyspnea x few days. Sent in evaluation for possible PNA, chf. Low suspicion for PE given pt is not tachy or hypoxic and currently on xarelto.   Vs stable.   Will obtain cardiac labs, ekg, cxr, and reassess, possibly admit for new onset chf. 78 y/o male with afib on xarleto, dm, htn, high chol here with dyspnea x few days. Sent in evaluation for possible PNA, chf. Low suspicion for PE given pt is not tachy or hypoxic and currently on xarelto.   Vs stable.   Will obtain cardiac labs, ekg, cxr, and reassess, possibly admit for new onset chf.    labs reviewed and notable elevated probnp 9k. Creatinine 4, pt does not know his baseline creatinine but has history of ckd.   CXR shows possible effusion vs pna. Pt has no leukocytosis, fever, or cough. Likely effusion.   Will admit to the hospital for new onset chf.

## 2024-11-16 NOTE — H&P ADULT - HISTORY OF PRESENT ILLNESS
78 y/o male PMH A. fib on Xarelto, DM, HTN, HLD, presents with dyspnea for few days. Pt reports having shortness of breath with exertion for few days. Pt also noted some wheezing. Denies fever, cough, chest pain,  vomiting, abdominal pain. Pt went to  had an xray showing possible CAP vs left pleural effusion and told to come to the ED. Pt reports having mild leg swelling. Denies recent travel, sick contact. Pt denies history of known CHF, BNP elevated in ER at 9,582.  78 y/o male PMH A. fib on Xarelto, DM, HTN, HLD, CKD V with baseline creatinine 3.95 presents with dyspnea for few days. Pt reports having shortness of breath with exertion for few days. Also has orthopnea. Denies fever, cough, chest pain,  vomiting, abdominal pain. Pt went to  had an xray showing possible CAP vs left pleural effusion and told to come to the ED. Pt reports having mild bilateral leg swelling. Denies recent travel, sick contact. Pt denies history of known CHF, BNP elevated in ER at 9,582.Has CKD V   at baseline and is follow by Nephrologist as outpatient.

## 2024-11-16 NOTE — H&P ADULT - NSHPLABSRESULTS_GEN_ALL_CORE
LABS:                        10.3   6.32  )-----------( 123      ( 16 Nov 2024 12:50 )             32.2     11-16    143  |  112[H]  |  69[H]  ----------------------------<  153[H]  4.5   |  26  |  4.07[H]    Ca    8.5      16 Nov 2024 12:50  Mg     2.0     11-16    TPro  6.9  /  Alb  2.8[L]  /  TBili  0.7  /  DBili  x   /  AST  37  /  ALT  25  /  AlkPhos  43  11-16    PT/INR - ( 16 Nov 2024 12:50 )   PT: 17.3 sec;   INR: 1.54 ratio         PTT - ( 16 Nov 2024 12:50 )  PTT:40.6 sec  Urinalysis Basic - ( 16 Nov 2024 12:50 )    Color: x / Appearance: x / SG: x / pH: x  Gluc: 153 mg/dL / Ketone: x  / Bili: x / Urobili: x   Blood: x / Protein: x / Nitrite: x   Leuk Esterase: x / RBC: x / WBC x   Sq Epi: x / Non Sq Epi: x / Bacteria: x          RADIOLOGY & ADDITIONAL TESTS:

## 2024-11-16 NOTE — H&P ADULT - ASSESSMENT
80 y/o male PMH A. fib on Xarelto, DM, HTN, HLD, CKD V with baseline creatinine 3.95 presents with dyspnea for few days. Pt reports having shortness of breath with exertion for few days. Also has orthopnea. Denies fever, cough, chest pain,  vomiting, abdominal pain. Pt went to  had an xray showing possible CAP vs left pleural effusion and told to come to the ED. Pt reports having mild bilateral leg swelling. Denies recent travel, sick contact. Pt denies history of known CHF, BNP elevated in ER at 9,582.Has CKD V at baseline and is follow by Nephrologist as outpatient.       Admit to tele for  WEST, orthopnea and moderate bilateral LE edema. CXR at Urgent Care notes left effusion or infiltrate. Will order CT chest non-contrast    to better eval for PVC of CAP. Started empiric Ceftriaxone for now, pending CT chest.  WBC count is normal. Urine for LG and Strep AG requested.     Will start IVP Lasix 60 mg daily, follow lytes daily. Will order renal sono for MRD eval, protein/creatinine ratio in urine and TTE to eval EF.     BNP elevated at 9,582, trop normal at 39.     Continue home meds: Cardura 4 mg/day, Xarelto 15 mg/day for chronic A.fib, Provachol 40 mg/day, Toprol 100 mg/day, Altace 5 mg/day and Flomax .4 mg/day.     Pharmacy recommended stopping Tricor with CKD V.      Renal consult Dr. Rosen requested for AM given CKD V.   80 y/o male PMH A. fib on Xarelto, DM, HTN, HLD, CKD V with baseline creatinine 3.95 presents with dyspnea for few days. Pt reports having shortness of breath with exertion for few days. Also has orthopnea. Denies fever, cough, chest pain,  vomiting, abdominal pain. Pt went to  had an xray showing possible CAP vs left pleural effusion and told to come to the ED. Pt reports having mild bilateral leg swelling. Denies recent travel, sick contact. Pt denies history of known CHF, BNP elevated in ER at 9,582.Has CKD V at baseline and is follow by Nephrologist as outpatient.       Admit to tele for  WEST, orthopnea and moderate bilateral LE edema. CXR at Urgent Care notes left effusion or infiltrate. Will order CT chest non-contrast    to better eval for PVC of CAP. Started empiric Ceftriaxone for now, pending CT chest.  WBC count is normal. Urine for LG and Strep AG requested.     Will start IVP Lasix 60 mg daily, follow lytes daily. Will order renal sono for MRD eval, protein/creatinine ratio in urine and TTE to eval EF.     BNP elevated at 9,582, trop normal at 39.     Glucose 152 on arrival, SSC, A1C in AM.     Continue home meds: Cardura 4 mg/day, Xarelto 15 mg/day for chronic A.fib, Provachol 40 mg/day, Toprol 100 mg/day, Altace 5 mg/day and Flomax .4 mg/day.     Pharmacy recommended stopping Tricor with CKD V.      Renal consult Dr. Rosen requested for AM given CKD V.

## 2024-11-16 NOTE — ED PROVIDER NOTE - ATTENDING APP SHARED VISIT CONTRIBUTION OF CARE
This patient was evaluated with TERRIE Gomez.  The patient's HPI, ROS, and physical exam above were noted and agreed to unless otherwise commented on below.  Nursing notes and vital signs were reviewed.     HPI: Patient is a 79-year-old male presenting to the emergency room today for shortness of breath with possible pleural effusion.  He has a history of atrial fibrillation for which he is on Xarelto.  History of diabetes, hypertension, and high cholesterol.  States that he is not having shortness of breath the last few days.  Shortness of breath with exertion as well.  He went to an urgent care today and had an x-ray performed that showed pneumonia versus pleural effusion.  For this reason he was advised to come to the emergency department here today.    Pertinent Physical Exam:   GENERAL: The patient appears well and is in no apparent distress.    EYES: Pupils equal and reactive.  Extraocular eye movements are intact.    ENT: Head is atraumatic.  Posterior oropharynx is unremarkable.      RESPIRATORY: Lungs are clear to auscultation bilaterally.  The patient has no significant wheezing, rhonchi, or rales.    CARDIOVASCULAR: The patient has a regular rate and rhythm with no significant murmurs, rubs, or gallops.    ABDOMEN: Abdomen is soft, nondistended, and non-peritoneal.  The patient has no focal areas of tenderness.    SKIN: Skin is intact without evidence of significant lacerations or sores.    MUSCULOSKELETAL: Patient has good range of motion of all extremities.  The patient has good distal cap refill.  The patient has palpable distal pulses.  No obvious edema is noted.    NEUROLOGIC: Cranial nerves II through XII are grossly within normal limits.  Sensory and motor examination is unremarkable.    PSYCHIATRIC: Patient is awake, alert, and oriented ×4.    MDM: Chest x-ray was performed that shows evidence of possible pleural effusion in the left lower lobe.  There is consideration for possible pneumonia, however the patient has no white blood cell count.  No fevers noted.  Minimal suspicion for pneumonia at this time.  His CBC shows no acute anemia.  No thrombocytopenia.  Slightly elevated INR at 1.54.  No significant electrode abnormalities.  Creatinine is elevated at 4.07.  No baseline is noted for this patient at this time but has an elevated BNP at 9582 which furthers the suspicion of pleural effusion.  COVID and flu are negative.  He is no history of CHF.    The patient has new onset CHF exacerbation with an elevated BNP, we believe that he would benefit from mission to the hospital for further workup and management.  TERRIE did discuss this case with the hospitalist service who accepted admission for the patient.

## 2024-11-16 NOTE — H&P ADULT - NSHPPHYSICALEXAM_GEN_ALL_CORE
PHYSICAL EXAMINATION:  Vital Signs Last 24 Hrs  T(C): 36.7 (16 Nov 2024 13:00), Max: 36.7 (16 Nov 2024 11:42)  T(F): 98 (16 Nov 2024 13:00), Max: 98 (16 Nov 2024 11:42)  HR: 61 (16 Nov 2024 13:00) (61 - 68)  BP: 175/86 (16 Nov 2024 13:00) (175/86 - 181/86)  BP(mean): --  RR: 20 (16 Nov 2024 13:00) (19 - 21)  SpO2: 94% (16 Nov 2024 13:00) (94% - 96%)    Parameters below as of 16 Nov 2024 13:00  Patient On (Oxygen Delivery Method): room air      CAPILLARY BLOOD GLUCOSE          GENERAL: NAD,   ENMT: mucous membranes moist  NECK: supple, No JVD  CHEST/LUNG: clear to auscultation bilaterally; no rales, rhonchi, or wheezing b/l  HEART: normal S1, S2  ABDOMEN: BS+, soft, ND, NT   EXTREMITIES:  pulses palpable; no clubbing, cyanosis, or edema b/l LEs  NEURO: awake, alert, interactive; moves all extremities PHYSICAL EXAMINATION:  Vital Signs Last 24 Hrs  T(C): 36.7 (16 Nov 2024 13:00), Max: 36.7 (16 Nov 2024 11:42)  T(F): 98 (16 Nov 2024 13:00), Max: 98 (16 Nov 2024 11:42)  HR: 61 (16 Nov 2024 13:00) (61 - 68)  BP: 175/86 (16 Nov 2024 13:00) (175/86 - 181/86)  BP(mean): --  RR: 20 (16 Nov 2024 13:00) (19 - 21)  SpO2: 94% (16 Nov 2024 13:00) (94% - 96%)    Parameters below as of 16 Nov 2024 13:00  Patient On (Oxygen Delivery Method): room air      CAPILLARY BLOOD GLUCOSE          GENERAL: NAD, seen in ER, comfortable on RA, has 2 plus bilateral LE edema.   ENMT: mucous membranes moist  NECK: supple, No JVD  CHEST/LUNG: clear to auscultation bilaterally; no rales, rhonchi, or wheezing b/l  HEART: normal S1, S2  ABDOMEN: BS+, soft, ND, NT   EXTREMITIES:  pulses palpable; no clubbing, cyanosis, or edema b/l LEs  NEURO: awake, alert, interactive; moves all extremities

## 2024-11-16 NOTE — ED ADULT NURSE NOTE - OBJECTIVE STATEMENT
80 yo male, A&Ox7, presents to ED c/o sob, patient states he went to  and was told to go to ED as the xray shows possible fluid in the lungs. Patient reports sob at rest, worse on exertion. Swelling noted to KALA LE +3. Denies fever, recent illness, change in diet or sick contacts. Patient able to speak clearly in full sentences. PMH: CHF, Afib, HTN, DMT2,

## 2024-11-16 NOTE — ED ADULT NURSE NOTE - NSFALLHARMRISKINTERV_ED_ALL_ED

## 2024-11-16 NOTE — ED PROVIDER NOTE - NS ED ROS FT
CONSTITUTIONAL: No fever, no chills, no fatigue  EYES: No visual changes  ENT: No ear pain, no sore throat  CARDIOVASCULAR: No chest pain, no palpitations  RESPIRATORY: No cough.   GI: No abdominal pain, no nausea, no vomiting, no constipation, no diarrhea  GENITOURINARY: No dysuria, no frequency, no hematuria  MUSKULOSKELETAL: No backpain, no joint pain, no myalgias  SKIN: No rash  NEURO: No headache    ALL OTHER SYSTEMS NEGATIVE.

## 2024-11-17 LAB
A1C WITH ESTIMATED AVERAGE GLUCOSE RESULT: 6 % — HIGH (ref 4–5.6)
ANION GAP SERPL CALC-SCNC: 11 MMOL/L — SIGNIFICANT CHANGE UP (ref 5–17)
BUN SERPL-MCNC: 64 MG/DL — HIGH (ref 7–23)
CALCIUM SERPL-MCNC: 8.6 MG/DL — SIGNIFICANT CHANGE UP (ref 8.5–10.1)
CHLORIDE SERPL-SCNC: 109 MMOL/L — HIGH (ref 96–108)
CHOLEST SERPL-MCNC: 99 MG/DL — SIGNIFICANT CHANGE UP
CO2 SERPL-SCNC: 20 MMOL/L — LOW (ref 22–31)
CREAT ?TM UR-MCNC: 42 MG/DL — SIGNIFICANT CHANGE UP
CREAT SERPL-MCNC: 3.86 MG/DL — HIGH (ref 0.5–1.3)
EGFR: 15 ML/MIN/1.73M2 — LOW
ESTIMATED AVERAGE GLUCOSE: 126 MG/DL — HIGH (ref 68–114)
GLUCOSE BLDC GLUCOMTR-MCNC: 146 MG/DL — HIGH (ref 70–99)
GLUCOSE BLDC GLUCOMTR-MCNC: 176 MG/DL — HIGH (ref 70–99)
GLUCOSE BLDC GLUCOMTR-MCNC: 178 MG/DL — HIGH (ref 70–99)
GLUCOSE BLDC GLUCOMTR-MCNC: 201 MG/DL — HIGH (ref 70–99)
GLUCOSE SERPL-MCNC: 130 MG/DL — HIGH (ref 70–99)
HCT VFR BLD CALC: 32.8 % — LOW (ref 39–50)
HDLC SERPL-MCNC: 30 MG/DL — LOW
HGB BLD-MCNC: 10.5 G/DL — LOW (ref 13–17)
LEGIONELLA AG UR QL: NEGATIVE — SIGNIFICANT CHANGE UP
LIPID PNL WITH DIRECT LDL SERPL: 56 MG/DL — SIGNIFICANT CHANGE UP
MAGNESIUM SERPL-MCNC: 2 MG/DL — SIGNIFICANT CHANGE UP (ref 1.6–2.6)
MCHC RBC-ENTMCNC: 29.9 PG — SIGNIFICANT CHANGE UP (ref 27–34)
MCHC RBC-ENTMCNC: 32 G/DL — SIGNIFICANT CHANGE UP (ref 32–36)
MCV RBC AUTO: 93.4 FL — SIGNIFICANT CHANGE UP (ref 80–100)
NON HDL CHOLESTEROL: 69 MG/DL — SIGNIFICANT CHANGE UP
NRBC # BLD: 0 /100 WBCS — SIGNIFICANT CHANGE UP (ref 0–0)
PLATELET # BLD AUTO: 125 K/UL — LOW (ref 150–400)
POTASSIUM SERPL-MCNC: 3.4 MMOL/L — LOW (ref 3.5–5.3)
POTASSIUM SERPL-SCNC: 3.4 MMOL/L — LOW (ref 3.5–5.3)
PROT ?TM UR-MCNC: 170 MG/DL — HIGH (ref 0–12)
PROT/CREAT UR-RTO: 4.1 RATIO — HIGH (ref 0–0.2)
RBC # BLD: 3.51 M/UL — LOW (ref 4.2–5.8)
RBC # FLD: 14.8 % — HIGH (ref 10.3–14.5)
S PNEUM AG UR QL: NEGATIVE — SIGNIFICANT CHANGE UP
SODIUM SERPL-SCNC: 140 MMOL/L — SIGNIFICANT CHANGE UP (ref 135–145)
TRIGL SERPL-MCNC: 55 MG/DL — SIGNIFICANT CHANGE UP
WBC # BLD: 6.35 K/UL — SIGNIFICANT CHANGE UP (ref 3.8–10.5)
WBC # FLD AUTO: 6.35 K/UL — SIGNIFICANT CHANGE UP (ref 3.8–10.5)

## 2024-11-17 PROCEDURE — 99232 SBSQ HOSP IP/OBS MODERATE 35: CPT

## 2024-11-17 RX ORDER — HYDRALAZINE HYDROCHLORIDE 10 MG/1
25 TABLET ORAL ONCE
Refills: 0 | Status: COMPLETED | OUTPATIENT
Start: 2024-11-17 | End: 2024-11-17

## 2024-11-17 RX ORDER — HYDRALAZINE HYDROCHLORIDE 10 MG/1
5 TABLET ORAL ONCE
Refills: 0 | Status: COMPLETED | OUTPATIENT
Start: 2024-11-17 | End: 2024-11-17

## 2024-11-17 RX ORDER — POTASSIUM CHLORIDE 600 MG/1
20 TABLET, EXTENDED RELEASE ORAL ONCE
Refills: 0 | Status: COMPLETED | OUTPATIENT
Start: 2024-11-17 | End: 2024-11-17

## 2024-11-17 RX ADMIN — HYDRALAZINE HYDROCHLORIDE 25 MILLIGRAM(S): 10 TABLET ORAL at 17:04

## 2024-11-17 RX ADMIN — Medication 10 MILLIGRAM(S): at 21:30

## 2024-11-17 RX ADMIN — LISINOPRIL 20 MILLIGRAM(S): 20 TABLET ORAL at 05:16

## 2024-11-17 RX ADMIN — Medication 4 MILLIGRAM(S): at 21:30

## 2024-11-17 RX ADMIN — HYDRALAZINE HYDROCHLORIDE 5 MILLIGRAM(S): 10 TABLET ORAL at 11:17

## 2024-11-17 RX ADMIN — FUROSEMIDE 60 MILLIGRAM(S): 40 TABLET ORAL at 05:16

## 2024-11-17 RX ADMIN — POTASSIUM CHLORIDE 20 MILLIEQUIVALENT(S): 600 TABLET, EXTENDED RELEASE ORAL at 17:04

## 2024-11-17 RX ADMIN — METOPROLOL TARTRATE 100 MILLIGRAM(S): 100 TABLET, FILM COATED ORAL at 05:16

## 2024-11-17 RX ADMIN — HYDRALAZINE HYDROCHLORIDE 25 MILLIGRAM(S): 10 TABLET ORAL at 12:22

## 2024-11-17 RX ADMIN — TAMSULOSIN HYDROCHLORIDE 0.4 MILLIGRAM(S): 0.4 CAPSULE ORAL at 21:30

## 2024-11-17 RX ADMIN — RIVAROXABAN 15 MILLIGRAM(S): 10 TABLET, FILM COATED ORAL at 11:17

## 2024-11-17 NOTE — PROGRESS NOTE ADULT - ASSESSMENT
Patient is a 79M w/ a PMH of afib, DM, HTN, CKD V who p/w WEST x 2 weeks admitted for hypoxic respiratory failure.       #Acute hypoxic respiratory failure   #Pulmonary edema   CT chest notable for volume overload and bilateral pleural effusions, loculated on the left.   Now s/p lasix IV on RA with improvement of symptoms.   Suspect volume overload 2/2 CHF, no prior hx. TTE pending.   -cont lasix IV   -f/u TTE  -on RA  -pulm consult tomorrow re loculated effusion    #CKD V  Polycystic kidney disease  Renal ultrasound w/ e/o polycystic kidney disease. Spoke with family at bedside who confirm patient has hx of polycystic kidney disease. Baseline Cr 3.9 per family  -Cr currently at baseline     #DM  Monitor POC glucose, can start SSI if patient becomes hyperglycemic     #HTN  Cont home doxazosin, lisinopril, metoprolol     #HLD  Atorvastatin     #Afib  cont home xarelto and metop    Updated family at bedside. Also updated patient's cardiologist Dr. George at [phone]

## 2024-11-17 NOTE — PATIENT PROFILE ADULT - FALL HARM RISK - HARM RISK INTERVENTIONS

## 2024-11-18 ENCOUNTER — RESULT REVIEW (OUTPATIENT)
Age: 79
End: 2024-11-18

## 2024-11-18 ENCOUNTER — TRANSCRIPTION ENCOUNTER (OUTPATIENT)
Age: 79
End: 2024-11-18

## 2024-11-18 VITALS — DIASTOLIC BLOOD PRESSURE: 90 MMHG | SYSTOLIC BLOOD PRESSURE: 154 MMHG | HEART RATE: 78 BPM

## 2024-11-18 LAB
ANION GAP SERPL CALC-SCNC: 9 MMOL/L — SIGNIFICANT CHANGE UP (ref 5–17)
BUN SERPL-MCNC: 65 MG/DL — HIGH (ref 7–23)
CALCIUM SERPL-MCNC: 8.7 MG/DL — SIGNIFICANT CHANGE UP (ref 8.5–10.1)
CHLORIDE SERPL-SCNC: 111 MMOL/L — HIGH (ref 96–108)
CO2 SERPL-SCNC: 23 MMOL/L — SIGNIFICANT CHANGE UP (ref 22–31)
CREAT SERPL-MCNC: 4.13 MG/DL — HIGH (ref 0.5–1.3)
EGFR: 14 ML/MIN/1.73M2 — LOW
GLUCOSE BLDC GLUCOMTR-MCNC: 145 MG/DL — HIGH (ref 70–99)
GLUCOSE BLDC GLUCOMTR-MCNC: 193 MG/DL — HIGH (ref 70–99)
GLUCOSE BLDC GLUCOMTR-MCNC: 233 MG/DL — HIGH (ref 70–99)
GLUCOSE SERPL-MCNC: 149 MG/DL — HIGH (ref 70–99)
HCT VFR BLD CALC: 34.5 % — LOW (ref 39–50)
HGB BLD-MCNC: 11.2 G/DL — LOW (ref 13–17)
MAGNESIUM SERPL-MCNC: 1.9 MG/DL — SIGNIFICANT CHANGE UP (ref 1.6–2.6)
MCHC RBC-ENTMCNC: 29.9 PG — SIGNIFICANT CHANGE UP (ref 27–34)
MCHC RBC-ENTMCNC: 32.5 G/DL — SIGNIFICANT CHANGE UP (ref 32–36)
MCV RBC AUTO: 92 FL — SIGNIFICANT CHANGE UP (ref 80–100)
NRBC # BLD: 0 /100 WBCS — SIGNIFICANT CHANGE UP (ref 0–0)
PHOSPHATE SERPL-MCNC: 4 MG/DL — SIGNIFICANT CHANGE UP (ref 2.5–4.5)
PLATELET # BLD AUTO: 148 K/UL — LOW (ref 150–400)
POTASSIUM SERPL-MCNC: 3.6 MMOL/L — SIGNIFICANT CHANGE UP (ref 3.5–5.3)
POTASSIUM SERPL-SCNC: 3.6 MMOL/L — SIGNIFICANT CHANGE UP (ref 3.5–5.3)
RBC # BLD: 3.75 M/UL — LOW (ref 4.2–5.8)
RBC # FLD: 14.7 % — HIGH (ref 10.3–14.5)
SODIUM SERPL-SCNC: 143 MMOL/L — SIGNIFICANT CHANGE UP (ref 135–145)
WBC # BLD: 7.48 K/UL — SIGNIFICANT CHANGE UP (ref 3.8–10.5)
WBC # FLD AUTO: 7.48 K/UL — SIGNIFICANT CHANGE UP (ref 3.8–10.5)

## 2024-11-18 PROCEDURE — 99223 1ST HOSP IP/OBS HIGH 75: CPT | Mod: 25

## 2024-11-18 PROCEDURE — 76604 US EXAM CHEST: CPT | Mod: 26

## 2024-11-18 PROCEDURE — 93306 TTE W/DOPPLER COMPLETE: CPT | Mod: 26

## 2024-11-18 PROCEDURE — 99239 HOSP IP/OBS DSCHRG MGMT >30: CPT

## 2024-11-18 PROCEDURE — 93356 MYOCRD STRAIN IMG SPCKL TRCK: CPT

## 2024-11-18 RX ORDER — LISINOPRIL 20 MG/1
1 TABLET ORAL
Qty: 0 | Refills: 0 | DISCHARGE
Start: 2024-11-18

## 2024-11-18 RX ORDER — DOXAZOSIN MESYLATE 1 MG
1 TABLET ORAL
Qty: 0 | Refills: 0 | DISCHARGE
Start: 2024-11-18

## 2024-11-18 RX ORDER — FUROSEMIDE 40 MG/1
1.5 TABLET ORAL
Qty: 45 | Refills: 0
Start: 2024-11-18

## 2024-11-18 RX ORDER — TOLVAPTAN 15 MG/1
1 TABLET ORAL
Qty: 1 | Refills: 0
Start: 2024-11-18

## 2024-11-18 RX ORDER — METOPROLOL TARTRATE 100 MG/1
1 TABLET, FILM COATED ORAL
Qty: 0 | Refills: 0 | DISCHARGE
Start: 2024-11-18

## 2024-11-18 RX ORDER — LOSARTAN POTASSIUM 100 MG/1
1 TABLET, FILM COATED ORAL
Qty: 30 | Refills: 0
Start: 2024-11-18

## 2024-11-18 RX ORDER — TAMSULOSIN HYDROCHLORIDE 0.4 MG/1
1 CAPSULE ORAL
Qty: 0 | Refills: 0 | DISCHARGE
Start: 2024-11-18

## 2024-11-18 RX ORDER — RIVAROXABAN 10 MG/1
1 TABLET, FILM COATED ORAL
Qty: 0 | Refills: 0 | DISCHARGE
Start: 2024-11-18

## 2024-11-18 RX ADMIN — LISINOPRIL 20 MILLIGRAM(S): 20 TABLET ORAL at 05:26

## 2024-11-18 RX ADMIN — FUROSEMIDE 60 MILLIGRAM(S): 40 TABLET ORAL at 05:26

## 2024-11-18 RX ADMIN — RIVAROXABAN 15 MILLIGRAM(S): 10 TABLET, FILM COATED ORAL at 11:41

## 2024-11-18 RX ADMIN — METOPROLOL TARTRATE 100 MILLIGRAM(S): 100 TABLET, FILM COATED ORAL at 05:25

## 2024-11-18 NOTE — PROGRESS NOTE ADULT - SUBJECTIVE AND OBJECTIVE BOX
[facility] NEPHROLOGY SERVICES, [facility]  NEPHROLOGY AND HYPERTENSION  [address]  [address]  [place] [address]  [phone]    MD TARA RICK, MD NIRANJAN DIAMOND, MD DADA SLAUGHTER, MD GREGORIO NOBLE MD          Patient events noted  No distress    MEDICATIONS  (STANDING):  atorvastatin 10 milliGRAM(s) Oral at bedtime  doxazosin 4 milliGRAM(s) Oral at bedtime  lisinopril 20 milliGRAM(s) Oral daily  metoprolol succinate  milliGRAM(s) Oral daily  rivaroxaban 15 milliGRAM(s) Oral daily  tamsulosin 0.4 milliGRAM(s) Oral at bedtime    MEDICATIONS  (PRN):      11-17-24 @ 07:01  -  11-18-24 @ 07:00  --------------------------------------------------------  IN: 360 mL / OUT: 0 mL / NET: 360 mL    11-18-24 @ 07:01  -  11-18-24 @ 15:50  --------------------------------------------------------  IN: 600 mL / OUT: 0 mL / NET: 600 mL      PHYSICAL EXAM:      T(C): 37.1 (11-18-24 @ 11:04), Max: 37.1 (11-17-24 @ 23:35)  HR: 78 (11-18-24 @ 11:04) (73 - 83)  BP: 152/89 (11-18-24 @ 11:04) (152/89 - 180/89)  RR: 17 (11-18-24 @ 11:04) (17 - 18)  SpO2: 95% (11-18-24 @ 11:04) (93% - 96%)  Wt(kg): --  Lungs clear  Heart S1S2  Abd soft NT ND  Extremities:   tr edema                                    11.2   7.48  )-----------( 148      ( 18 Nov 2024 07:22 )             34.5     11-18    143  |  111[H]  |  65[H]  ----------------------------<  149[H]  3.6   |  23  |  4.13[H]    Ca    8.7      18 Nov 2024 07:22  Phos  4.0     11-18  Mg     1.9     11-18          Creatinine Trend: 4.13<--, 3.86<--, 4.07<--      Assessment   CKD 5; ADPKD; PNA  Component of fluid overload     Plan  IV Ceftriaxone   Maintenance diuretics at discharge   Stable from renal view   Will follow     Raffi Rosen MD
[facility] NEPHROLOGY SERVICES, [facility]  NEPHROLOGY AND HYPERTENSION  [address]  [address]  [place] [address]  [phone]    MD TARA RICK, MD NIRANJAN DIAMOND, MD DADA SLAUGHTER, MD GREGORIO NOBLE MD          Patient events noted    MEDICATIONS  (STANDING):  atorvastatin 10 milliGRAM(s) Oral at bedtime  doxazosin 4 milliGRAM(s) Oral at bedtime  furosemide   Injectable 60 milliGRAM(s) IV Push daily  lisinopril 20 milliGRAM(s) Oral daily  metoprolol succinate  milliGRAM(s) Oral daily  rivaroxaban 15 milliGRAM(s) Oral daily  tamsulosin 0.4 milliGRAM(s) Oral at bedtime    MEDICATIONS  (PRN):      11-16-24 @ 07:01  -  11-17-24 @ 07:00  --------------------------------------------------------  IN: 440 mL / OUT: 1550 mL / NET: -1110 mL    11-17-24 @ 07:01  -  11-17-24 @ 23:03  --------------------------------------------------------  IN: 360 mL / OUT: 0 mL / NET: 360 mL      PHYSICAL EXAM:      T(C): 36.7 (11-17-24 @ 16:48), Max: 37.1 (11-16-24 @ 23:24)  HR: 79 (11-17-24 @ 18:57) (76 - 86)  BP: 168/86 (11-17-24 @ 18:57) (168/86 - 192/90)  RR: 18 (11-17-24 @ 16:48) (18 - 18)  SpO2: 96% (11-17-24 @ 16:48) (94% - 97%)  Wt(kg): --  Lungs clear  Heart S1S2  Abd soft NT ND  Extremities:   tr edema                                    10.5   6.35  )-----------( 125      ( 17 Nov 2024 06:15 )             32.8     11-17    140  |  109[H]  |  64[H]  ----------------------------<  130[H]  3.4[L]   |  20[L]  |  3.86[H]    Ca    8.6      17 Nov 2024 06:15  Mg     2.0     11-17    TPro  6.9  /  Alb  2.8[L]  /  TBili  0.7  /  DBili  x   /  AST  37  /  ALT  25  /  AlkPhos  43  11-16      LIVER FUNCTIONS - ( 16 Nov 2024 12:50 )  Alb: 2.8 g/dL / Pro: 6.9 gm/dL / ALK PHOS: 43 U/L / ALT: 25 U/L / AST: 37 U/L / GGT: x           Creatinine Trend: 3.86<--, 4.07<--      Assessment   CKD 5; ADPKD; PNA  Component of fluid overload     Plan  IV Ceftriaxone   Replete K  Maintenance diuretics  Will follow       Raffi Rosen MD
Patient is a 79y old  Male who presents with a chief complaint of SOB with LLL infiltrate and bilateral LE edema. (16 Nov 2024 20:00)      INTERVAL HPI/OVERNIGHT EVENTS:  -now on RA   -patient seen and examined at bedside states he is breathing better after the lasix   -TTE read pending     MEDICATIONS  (STANDING):  atorvastatin 10 milliGRAM(s) Oral at bedtime  cefTRIAXone   IVPB 1000 milliGRAM(s) IV Intermittent every 24 hours  doxazosin 4 milliGRAM(s) Oral at bedtime  furosemide   Injectable 60 milliGRAM(s) IV Push daily  lisinopril 20 milliGRAM(s) Oral daily  metoprolol succinate  milliGRAM(s) Oral daily  rivaroxaban 15 milliGRAM(s) Oral daily  tamsulosin 0.4 milliGRAM(s) Oral at bedtime    MEDICATIONS  (PRN):      Allergies    No Known Allergies    Intolerances        REVIEW OF SYSTEMS:  No CP, SOB, abdominal pain, constipation, nausea, vomiting,   +orthopnea x 2 weeks improved after lasix     Vital Signs Last 24 Hrs  T(C): 36.6 (17 Nov 2024 10:41), Max: 37.1 (16 Nov 2024 23:24)  T(F): 97.8 (17 Nov 2024 10:41), Max: 98.7 (16 Nov 2024 23:24)  HR: 76 (17 Nov 2024 10:41) (76 - 86)  BP: 174/103 (17 Nov 2024 10:41) (160/90 - 197/93)  BP(mean): --  RR: 18 (17 Nov 2024 10:41) (18 - 19)  SpO2: 97% (17 Nov 2024 10:41) (94% - 97%)    Parameters below as of 16 Nov 2024 21:10  Patient On (Oxygen Delivery Method): nasal cannula        PHYSICAL EXAM:  GENERAL: NAD, well-groomed, well-developed  HEAD:  Atraumatic, Normocephalic  EYES: EOMI, sclera non-icteric  ENMT:  Moist mucous membranes, Good dentition,  NERVOUS SYSTEM:  Alert & Oriented X3, Good concentration  CHEST/LUNG: Clear to percussion bilaterally; No rales, rhonchi, wheezing, or rubs  HEART: Regular rate and rhythm; No murmurs, rubs, or gallops  ABDOMEN: Soft, Nontender, Nondistended; Bowel sounds present  EXTREMITIES:  2+ Peripheral Pulses, 1+ pitting LE edema pre-tibial   SKIN: No rashes or lesions      LABS:                        10.5   6.35  )-----------( 125      ( 17 Nov 2024 06:15 )             32.8     11-17    140  |  109[H]  |  64[H]  ----------------------------<  130[H]  3.4[L]   |  20[L]  |  3.86[H]    Ca    8.6      17 Nov 2024 06:15  Mg     2.0     11-17    TPro  6.9  /  Alb  2.8[L]  /  TBili  0.7  /  DBili  x   /  AST  37  /  ALT  25  /  AlkPhos  43  11-16    PT/INR - ( 16 Nov 2024 12:50 )   PT: 17.3 sec;   INR: 1.54 ratio         PTT - ( 16 Nov 2024 12:50 )  PTT:40.6 sec  Urinalysis Basic - ( 17 Nov 2024 06:15 )    Color: x / Appearance: x / SG: x / pH: x  Gluc: 130 mg/dL / Ketone: x  / Bili: x / Urobili: x   Blood: x / Protein: x / Nitrite: x   Leuk Esterase: x / RBC: x / WBC x   Sq Epi: x / Non Sq Epi: x / Bacteria: x      CAPILLARY BLOOD GLUCOSE      POCT Blood Glucose.: 201 mg/dL (17 Nov 2024 10:40)  POCT Blood Glucose.: 146 mg/dL (17 Nov 2024 07:34)  POCT Blood Glucose.: 116 mg/dL (16 Nov 2024 18:28)      RADIOLOGY & ADDITIONAL TESTS:    Imaging Personally Reviewed:  [ X] YES  [ ] NO    Consultant(s) Notes Reviewed:  [ X] YES  [ ] NO    Care Discussed with Consultants/Other Providers [X ] YES  [ ] NO

## 2024-11-18 NOTE — DISCHARGE NOTE NURSING/CASE MANAGEMENT/SOCIAL WORK - PATIENT PORTAL LINK FT
You can access the FollowMyHealth Patient Portal offered by [facility] by registering at the following website: [email or web address]. By joining [employer]’s FollowMyHealth portal, you will also be able to view your health information using other applications (apps) compatible with our system.

## 2024-11-18 NOTE — CONSULT NOTE ADULT - SUBJECTIVE AND OBJECTIVE BOX
Patient is a 79y old  Male who presents with a chief complaint of SOB with LLL infiltrate and bilateral LE edema.      HPI:  78 y/o male PMH A. fib on Xarelto, DM, HTN, HLD, CKD V with baseline creatinine 3.95 presents with dyspnea for few days. Pt reports having shortness of breath with exertion for few days. Also has orthopnea. Denies fever, cough, chest pain,  vomiting, abdominal pain. Pt went to  had an xray showing possible CAP vs left pleural effusion and told to come to the ED. Pt reports having mild bilateral leg swelling. Denies recent travel, sick contact. Pt denies history of known CHF, BNP elevated in ER at 9,582.Has CKD V   at baseline and is follow by Nephrologist as outpatient.   (16 Nov 2024 15:22)      Allergies  No Known Allergies              MEDICATIONS  (STANDING):  atorvastatin 10 milliGRAM(s) Oral at bedtime  doxazosin 4 milliGRAM(s) Oral at bedtime  lisinopril 20 milliGRAM(s) Oral daily  metoprolol succinate  milliGRAM(s) Oral daily  rivaroxaban 15 milliGRAM(s) Oral daily  tamsulosin 0.4 milliGRAM(s) Oral at bedtime    MEDICATIONS  (PRN):      Drug Dosing Weight  Height (cm): 185.4 (16 Nov 2024 11:42)  Weight (kg): 124.7 (16 Nov 2024 11:42)  BMI (kg/m2): 36.3 (16 Nov 2024 11:42)  BSA (m2): 2.46 (16 Nov 2024 11:42)    PAST MEDICAL & SURGICAL HISTORY:  Afib      Diabetes mellitus      Hypertension      High cholesterol      Polycystic kidney    FAMILY HISTORY:  polycystic kidney    SOCIAL HISTORY:  non smoker      REVIEW OF SYSTEMS:  CONSTITUTIONAL: No fever, weight loss, or fatigue  EYES: No eye pain, visual disturbances, or discharge  ENMT:  No difficulty hearing, tinnitus, vertigo; No sinus or throat pain  NECK: No pain or stiffness  RESPIRATORY: No cough, wheezing, chills or hemoptysis; No shortness of breath- resolved  CARDIOVASCULAR: No chest pain, palpitations, dizziness,  +leg swelling  GASTROINTESTINAL: No abdominal or epigastric pain. No nausea, vomiting, or hematemesis; No diarrhea or constipation. No melena or hematochezia.  GENITOURINARY: No dysuria, frequency, hematuria, or incontinence + BPH  NEUROLOGICAL: No headaches, memory loss, loss of strength, numbness, or tremors  SKIN: No itching, burning, rashes, or lesions   LYMPH NODES: No enlarged glands  ENDOCRINE: No heat or cold intolerance; No hair loss  MUSCULOSKELETAL: No joint pain or swelling; No muscle, back, or extremity pain  PSYCHIATRIC: No depression, anxiety, mood swings, or difficulty sleeping  HEME/LYMPH: No easy bruising, or bleeding gums  ALLERGY AND IMMUNOLOGIC: No hives or eczema          Vital Signs Last 24 Hrs  T(C): 37.2 (18 Nov 2024 13:10), Max: 37.2 (18 Nov 2024 13:10)  T(F): 98.9 (18 Nov 2024 13:10), Max: 98.9 (18 Nov 2024 13:10)  HR: 78 (18 Nov 2024 16:54) (78 - 86)  BP: 154/90 (18 Nov 2024 16:54) (154/90 - 185/90)  RR: 18 (18 Nov 2024 13:10) (18 - 18)  SpO2: 96% (18 Nov 2024 13:10) (96% - 96%)          PHYSICAL EXAM:  GENERAL: NAD, sitting up in chair  HEAD:  Atraumatic, Normocephalic  EYES: conjunctiva and sclera clear  ENMT: No tonsillar erythema, exudates, Moist mucous membranes, No lesions  NECK: Supple, No JVD  NERVOUS SYSTEM:  Alert & Oriented X3, Good concentration; Motor Strength 5/5 B/L upper and lower extremities  CHEST/LUNG: Clear to ausculatation bilaterally; No rales, rhonchi, wheezing  HEART: Regular rate and rhythm; No murmurs, rubs, or gallops  ABDOMEN: Soft, Nontender, NondisteD  nded; Bowel sounds present  EXTREMITIES:  2+ Peripheral Pulses, No clubbing, cyanosis, or edema  LYMPH: No lymphadenopathy noted  SKIN: No rashes or lesions    LABS:  CBC Full  -  ( 18 Nov 2024 07:22 )  WBC Count : 7.48 K/uL  RBC Count : 3.75 M/uL  Hemoglobin : 11.2 g/dL  Hematocrit : 34.5 %  Platelet Count - Automated : 148 K/uL  Mean Cell Volume : 92.0 fl  Mean Cell Hemoglobin : 29.9 pg  Mean Cell Hemoglobin Concentration : 32.5 g/dL  Auto Neutrophil # : x  Auto Lymphocyte # : x  Auto Monocyte # : x  Auto Eosinophil # : x  Auto Basophil # : x  Auto Neutrophil % : x  Auto Lymphocyte % : x  Auto Monocyte % : x  Auto Eosinophil % : x  Auto Basophil % : x    11-18    143  |  111[H]  |  65[H]  ----------------------------<  149[H]  3.6   |  23  |  4.13[H]    Ca    8.7      18 Nov 2024 07:22  Phos  4.0     11-18  Mg     1.9     11-18      CAPILLARY BLOOD GLUCOSE      POCT Blood Glucose.: 193 mg/dL (18 Nov 2024 16:29)      Urinalysis Basic - ( 18 Nov 2024 07:22 )    Color: x / Appearance: x / SG: x / pH: x  Gluc: 149 mg/dL / Ketone: x  / Bili: x / Urobili: x   Blood: x / Protein: x / Nitrite: x   Leuk Esterase: x / RBC: x / WBC x   Sq Epi: x / Non Sq Epi: x / Bacteria: x              EKG:    ECHO, US:    RADIOLOGY:    CRITICAL CARE TIME SPENT:   Patient is a 79y old  Male who presents with a chief complaint of SOB with LLL infiltrate and bilateral LE edema.      HPI:  80 y/o male PMH A. fib on Xarelto, DM, HTN, HLD, CKD V with baseline creatinine 3.95 presents with dyspnea for few days. Pt reports having shortness of breath with exertion for few days. Also has orthopnea. Denies fever, cough, chest pain,  vomiting, abdominal pain. Pt went to  had an xray showing possible CAP vs left pleural effusion and told to come to the ED. Pt reports having mild bilateral leg swelling. Denies recent travel, sick contact. Pt denies history of known CHF, BNP elevated in ER at 9,582.Has CKD V at baseline and is follow by Nephrologist as outpatient.   (16 Nov 2024 15:22)      Allergies  No Known Allergies              MEDICATIONS  (STANDING):  atorvastatin 10 milliGRAM(s) Oral at bedtime  doxazosin 4 milliGRAM(s) Oral at bedtime  lisinopril 20 milliGRAM(s) Oral daily  metoprolol succinate  milliGRAM(s) Oral daily  rivaroxaban 15 milliGRAM(s) Oral daily  tamsulosin 0.4 milliGRAM(s) Oral at bedtime    MEDICATIONS  (PRN):      Drug Dosing Weight  Height (cm): 185.4 (16 Nov 2024 11:42)  Weight (kg): 124.7 (16 Nov 2024 11:42)  BMI (kg/m2): 36.3 (16 Nov 2024 11:42)  BSA (m2): 2.46 (16 Nov 2024 11:42)    PAST MEDICAL & SURGICAL HISTORY:  Afib      Diabetes mellitus      Hypertension      High cholesterol      Polycystic kidney    FAMILY HISTORY:  polycystic kidney    SOCIAL HISTORY:  non smoker      REVIEW OF SYSTEMS:  CONSTITUTIONAL: No fever, weight loss, or fatigue  EYES: No eye pain, visual disturbances, or discharge  ENMT:  No difficulty hearing, tinnitus, vertigo; No sinus or throat pain  NECK: No pain or stiffness  RESPIRATORY: No cough, wheezing, chills or hemoptysis; No shortness of breath- resolved  CARDIOVASCULAR: No chest pain, palpitations, dizziness,  +leg swelling  GASTROINTESTINAL: No abdominal or epigastric pain. No nausea, vomiting, or hematemesis; No diarrhea or constipation. No melena or hematochezia.  GENITOURINARY: No dysuria, frequency, hematuria, or incontinence + BPH  NEUROLOGICAL: No headaches, memory loss, loss of strength, numbness, or tremors  SKIN: No itching, burning, rashes, or lesions   LYMPH NODES: No enlarged glands  ENDOCRINE: No heat or cold intolerance; No hair loss  MUSCULOSKELETAL: No joint pain or swelling; No muscle, back, or extremity pain  PSYCHIATRIC: No depression, anxiety, mood swings, or difficulty sleeping  HEME/LYMPH: No easy bruising, or bleeding gums  ALLERGY AND IMMUNOLOGIC: No hives or eczema          Vital Signs Last 24 Hrs  T(C): 37.2 (18 Nov 2024 13:10), Max: 37.2 (18 Nov 2024 13:10)  T(F): 98.9 (18 Nov 2024 13:10), Max: 98.9 (18 Nov 2024 13:10)  HR: 78 (18 Nov 2024 16:54) (78 - 86)  BP: 154/90 (18 Nov 2024 16:54) (154/90 - 185/90)  RR: 18 (18 Nov 2024 13:10) (18 - 18)  SpO2: 96% (18 Nov 2024 13:10) (96% - 96%)          PHYSICAL EXAM:  GENERAL: NAD, sitting up in chair  HEAD:  Atraumatic, Normocephalic  EYES: conjunctiva and sclera clear  ENMT: No tonsillar erythema, exudates, Moist mucous membranes, No lesions  NECK: Supple, No JVD  NERVOUS SYSTEM:  Alert & Oriented X3, Good concentration; Motor Strength 5/5 B/L upper and lower extremities  CHEST/LUNG: Clear to ausculatation bilaterally; No rales, rhonchi, wheezing  HEART: Regular rate and rhythm; No murmurs, rubs, or gallops  ABDOMEN: Soft, Nontender, NondisteD  nded; Bowel sounds present  EXTREMITIES:  2+ Peripheral Pulses, No clubbing, cyanosis, or edema  LYMPH: No lymphadenopathy noted  SKIN: No rashes or lesions    LABS:                        11.2   7.48  )-----------( 148      ( 18 Nov 2024 07:22 )             34.5       11-18    143  |  111[H]  |  65[H]  ----------------------------<  149[H]  3.6   |  23  |  4.13[H]    Ca    8.7      18 Nov 2024 07:22  Phos  4.0     11-18  Mg     1.9     11-18      CAPILLARY BLOOD GLUCOSE  POCT Blood Glucose.: 193 mg/dL (18 Nov 2024 16:29)      Pro-Brain Natriuretic Peptide (11.16.24 @ 12:50)    Pro-Brain Natriuretic Peptide: 9582 pg/mL      US:  < from: US Renal (11.16.24 @ 17:40) >  Enlarged kidneys bilaterally with innumerable cysts compatible with   autosomal dominant polycystic kidney disease. Correlation is recommended.    Large urinary bladder postvoid residual volume.        RADIOLOGY:  < from: Xray Chest 2 Views PA/Lat (11.16.24 @ 13:54) >  Cardiomegaly.  LEFT lower zone airspace consolidation and/or mild effusion.    ----------------------  < from: CT Chest No Cont (11.16.24 @ 17:46) >  Interlobular septal thickening and peribronchial thickening may represent   fluid overload. Small bilateral pleural effusions, loculated on the left   with partial atelectasis of both lower lobes.  There is a small   pericardial effusion. Follow-up to resolution

## 2024-11-18 NOTE — DISCHARGE NOTE NURSING/CASE MANAGEMENT/SOCIAL WORK - NSDCFUADDAPPT_GEN_ALL_CORE_FT
APPTS ARE READY TO BE MADE: [X] YES    Best Family or Patient Contact (if needed):    Additional Information about above appointments (if needed):    1: PCP  2: pulm  3:     Other comments or requests:

## 2024-11-18 NOTE — DIETITIAN INITIAL EVALUATION ADULT - ENTER TO (ML/KG)
Additional Notes: Start Tretinoin 0.25% nightly as tolerated\\nSamples of Epiduo Forte given. For spot treatment.\\nRecommended a gentle cleanser and moisturizer (samples of CeraVe given)
Detail Level: Detailed
30

## 2024-11-18 NOTE — DISCHARGE NOTE PROVIDER - NSDCMRMEDTOKEN_GEN_ALL_CORE_FT
atorvastatin 10 mg oral tablet: 1 tab(s) orally once a day (at bedtime)  doxazosin 4 mg oral tablet: 1 tab(s) orally once a day (at bedtime)  erythromycin 0.5% ophthalmic ointment: 1 application in each affected eye once a day (at bedtime) apply cream to left eye once a day at bedtime for 7 days  lisinopril 20 mg oral tablet: 1 tab(s) orally once a day  metoprolol succinate 100 mg oral tablet, extended release: 1 tab(s) orally once a day  rivaroxaban 15 mg oral tablet: 1 tab(s) orally once a day  tamsulosin 0.4 mg oral capsule: 1 cap(s) orally once a day (at bedtime)   atorvastatin 10 mg oral tablet: 1 tab(s) orally once a day (at bedtime)  doxazosin 4 mg oral tablet: 1 tab(s) orally once a day (at bedtime)  erythromycin 0.5% ophthalmic ointment: 1 application in each affected eye once a day (at bedtime) apply cream to left eye once a day at bedtime for 7 days  lisinopril 20 mg oral tablet: 1 tab(s) orally once a day  metoprolol succinate 100 mg oral tablet, extended release: 1 tab(s) orally once a day  rivaroxaban 15 mg oral tablet: 1 tab(s) orally once a day  tamsulosin 0.4 mg oral capsule: 1 cap(s) orally once a day (at bedtime)  tolvaptan 45 mg-15 mg oral tablet: 1 cap(s) orally once a day   atorvastatin 10 mg oral tablet: 1 tab(s) orally once a day (at bedtime)  doxazosin 4 mg oral tablet: 1 tab(s) orally once a day (at bedtime)  erythromycin 0.5% ophthalmic ointment: 1 application in each affected eye once a day (at bedtime) apply cream to left eye once a day at bedtime for 7 days  Lasix 40 mg oral tablet: 1.5 tab(s) orally once a day as needed for  shortness of breath and/or wheezing  lisinopril 20 mg oral tablet: 1 tab(s) orally once a day  metoprolol succinate 100 mg oral tablet, extended release: 1 tab(s) orally once a day  rivaroxaban 15 mg oral tablet: 1 tab(s) orally once a day  tamsulosin 0.4 mg oral capsule: 1 cap(s) orally once a day (at bedtime)  tolvaptan 45 mg-15 mg oral tablet: 1 cap(s) orally once a day

## 2024-11-18 NOTE — PROGRESS NOTE ADULT - REASON FOR ADMISSION
SOB with LLL infiltrate and bilateral LE edema.

## 2024-11-18 NOTE — DISCHARGE NOTE PROVIDER - NSDCFUADDAPPT_GEN_ALL_CORE_FT
APPTS ARE READY TO BE MADE: [X] YES    Best Family or Patient Contact (if needed):    Additional Information about above appointments (if needed):    1: PCP  2: pulm  3:     Other comments or requests:    APPTS ARE READY TO BE MADE: [X] YES    Best Family or Patient Contact (if needed):    Additional Information about above appointments (if needed):    1: PCP  2: pulm  3:     Other comments or requests:   Patient advises they do not want our assistance and prefer to coordinate the non - [facility] appointments on their own. No information was provided to the patient.

## 2024-11-18 NOTE — DISCHARGE NOTE NURSING/CASE MANAGEMENT/SOCIAL WORK - FINANCIAL ASSISTANCE
[facility] provides services at a reduced cost to those who are determined to be eligible through [facility]’s financial assistance program. Information regarding [facility]’s financial assistance program can be found by going to [email or web address] or by calling [phone].

## 2024-11-18 NOTE — DISCHARGE NOTE PROVIDER - NSDCCPCAREPLAN_GEN_ALL_CORE_FT
PRINCIPAL DISCHARGE DIAGNOSIS  Diagnosis: Dyspnea  Assessment and Plan of Treatment: You came to the hospital with shortness of breath due to fluid in your lungs. This resolved with IV diuretics. Please take your Jynarque as prescribed. I will contact you once the results of your echocardiogram is back.      SECONDARY DISCHARGE DIAGNOSES  Diagnosis: Stage 4 chronic kidney disease  Assessment and Plan of Treatment: Your creatinine was 4.1 at time of discharge. Please have your PCP check your labs this week to ensure your creatinine remains stable.     PRINCIPAL DISCHARGE DIAGNOSIS  Diagnosis: Dyspnea  Assessment and Plan of Treatment: You came to the hospital with shortness of breath due to fluid in your lungs. This resolved with IV diuretics. Please take your Jynarque as prescribed. Please take lasix as needed for shortness of breath and follow up with your cardiologist. I will contact you once the results of your echocardiogram is back.      SECONDARY DISCHARGE DIAGNOSES  Diagnosis: Stage 4 chronic kidney disease  Assessment and Plan of Treatment: Your creatinine was 4.1 at time of discharge. Please have your PCP check your labs this week to ensure your creatinine remains stable.

## 2024-11-18 NOTE — DISCHARGE NOTE PROVIDER - ATTENDING DISCHARGE PHYSICAL EXAMINATION:
Vital Signs Last 24 Hrs  T(C): 37.2 (18 Nov 2024 13:10), Max: 37.2 (18 Nov 2024 13:10)  T(F): 98.9 (18 Nov 2024 13:10), Max: 98.9 (18 Nov 2024 13:10)  HR: 86 (18 Nov 2024 13:10) (73 - 86)  BP: 185/90 (18 Nov 2024 13:10) (152/89 - 185/90)  BP(mean): --  RR: 18 (18 Nov 2024 13:10) (17 - 18)  SpO2: 96% (18 Nov 2024 13:10) (93% - 96%)    GENERAL: NAD, well-groomed, well-developed  HEAD:  Atraumatic, Normocephalic  EYES: EOMI, sclera non-icteric  ENMT:  Moist mucous membranes, Good dentition,  NERVOUS SYSTEM:  Alert & Oriented X3, Good concentration  CHEST/LUNG: Clear to percussion bilaterally; No rales, rhonchi, wheezing, or rubs  HEART: Regular rate and rhythm; No murmurs, rubs, or gallops  ABDOMEN: Soft, Nontender, Nondistended; Bowel sounds present  EXTREMITIES:  2+ Peripheral Pulses, trace pre-tibial edema   SKIN: No rashes or lesions

## 2024-11-18 NOTE — DIETITIAN INITIAL EVALUATION ADULT - PERTINENT LABORATORY DATA
11-18    143  |  111[H]  |  65[H]  ----------------------------<  149[H]  3.6   |  23  |  4.13[H]    Ca    8.7      18 Nov 2024 07:22  Phos  4.0     11-18  Mg     1.9     11-18    POCT Blood Glucose.: 233 mg/dL (11-18-24 @ 11:11)  A1C with Estimated Average Glucose Result: 6.0 % (11-17-24 @ 06:15)

## 2024-11-18 NOTE — DISCHARGE NOTE PROVIDER - NSDCFUADDINST_GEN_ALL_CORE_FT
Please have your primary care provider check your creatinine (kidney function) within the next week

## 2024-11-18 NOTE — DISCHARGE NOTE PROVIDER - HOSPITAL COURSE
HPI:  80 y/o male PMH A. fib on Xarelto, DM, HTN, HLD, CKD V with baseline creatinine 3.95 presents with dyspnea for few days. Pt reports having shortness of breath with exertion for few days. Also has orthopnea. Denies fever, cough, chest pain,  vomiting, abdominal pain. Pt went to  had an xray showing possible CAP vs left pleural effusion and told to come to the ED. Pt reports having mild bilateral leg swelling. Denies recent travel, sick contact. Pt denies history of known CHF, BNP elevated in ER at 9,582.Has CKD V   at baseline and is follow by Nephrologist as outpatient.    Patient presented with hypoxic respiratory failure initially requiring supplemental oxygen. Patient received IV lasix and was weaned to RA. Nephro was consulted given CKD V. Baseline Cr 3.9 was 4.1 at time of discharge. Patient and family state they want to leave and will follow up with their PCP for blood work this week.     Patient without a prior hx of CHF. TTE ordered this admission was notable for. Spoke with patient's cardiologist during this admission who will follow up with the patient in clinic.     CT chest was notable for loculated effusion. Pulm consulted, recommended continued diuresis and to follow up with pulm in clinic.     Patient was on room air and medically stable at the time of discharge.     #Acute hypoxic respiratory failure   #Pulmonary edema   CT chest notable for volume overload and bilateral pleural effusions, loculated on the left.   Now s/p lasix IV on RA with improvement of symptoms.   Suspect volume overload 2/2 CHF, no prior hx. TTE pending.   -cont lasix IV -->cont home diuretic at time of discharge   -f/u TTE  -on RA  -pulm consult tomorrow re loculated effusion    #CKD V  Polycystic kidney disease  Renal ultrasound w/ e/o polycystic kidney disease. Spoke with family at bedside who confirm patient has hx of polycystic kidney disease. Baseline Cr 3.9 per family  -Cr currently at baseline     #DM  Monitor POC glucose, can start SSI if patient becomes hyperglycemic     #HTN  Cont home doxazosin, lisinopril, metoprolol     #HLD  Atorvastatin     #Afib  cont home xarelto and metop   HPI:  78 y/o male PMH A. fib on Xarelto, DM, HTN, HLD, CKD V with baseline creatinine 3.95 presents with dyspnea for few days. Pt reports having shortness of breath with exertion for few days. Also has orthopnea. Denies fever, cough, chest pain,  vomiting, abdominal pain. Pt went to  had an xray showing possible CAP vs left pleural effusion and told to come to the ED. Pt reports having mild bilateral leg swelling. Denies recent travel, sick contact. Pt denies history of known CHF, BNP elevated in ER at 9,582.Has CKD V   at baseline and is follow by Nephrologist as outpatient.    Patient presented with hypoxic respiratory failure initially requiring supplemental oxygen. Patient received IV lasix and was weaned to RA. Nephro was consulted given CKD V. Baseline Cr 3.9 was 4.1 at time of discharge. Patient and family state they want to leave and will follow up with their PCP for blood work this week.     Patient without a prior hx of CHF. TTE ordered this admission was notable for. Spoke with patient's cardiologist during this admission who will follow up with the patient in clinic.     CT chest was notable for loculated effusion. Pulm consulted, recommended continued diuresis and to follow up with pulm in clinic.     Patient was on room air and medically stable at the time of discharge.     #Acute hypoxic respiratory failure   #Pulmonary edema   CT chest notable for volume overload and bilateral pleural effusions, loculated on the left.   Now s/p lasix IV on RA with improvement of symptoms.   Suspect volume overload 2/2 CHF, no prior hx. TTE pending.   -cont lasix IV -->cont home diuretic at time of discharge   -f/u TTE  -on RA  -pulm consult re loculated effusion-->recommended continuing diuresis     #CKD V  Polycystic kidney disease  Renal ultrasound w/ e/o polycystic kidney disease. Spoke with family at bedside who confirm patient has hx of polycystic kidney disease. Baseline Cr 3.9 per family  -Cr currently at baseline   -patient states he takes tolvaptan at home    #DM  Monitor POC glucose, can start SSI if patient becomes hyperglycemic     #HTN  Cont home doxazosin, lisinopril, metoprolol     #HLD  Atorvastatin     #Afib  cont home xarelto and metop   HPI:  80 y/o male PMH A. fib on Xarelto, DM, HTN, HLD, CKD V with baseline creatinine 3.95 presents with dyspnea for few days. Pt reports having shortness of breath with exertion for few days. Also has orthopnea. Denies fever, cough, chest pain,  vomiting, abdominal pain. Pt went to  had an xray showing possible CAP vs left pleural effusion and told to come to the ED. Pt reports having mild bilateral leg swelling. Denies recent travel, sick contact. Pt denies history of known CHF, BNP elevated in ER at 9,582.Has CKD V   at baseline and is follow by Nephrologist as outpatient.    Patient presented with hypoxic respiratory failure initially requiring supplemental oxygen. Patient received IV lasix and was weaned to RA. Nephro was consulted given CKD V. Baseline Cr 3.9 was 4.1 at time of discharge. Patient and family state they want to leave and will follow up with their PCP for blood work this week.     Patient without a prior hx of CHF. TTE completed but not read at time of discharge. Patient states he wants to leave. I will contact him tomorrow with the results. He is amenable to this plan.    CT chest was notable for loculated effusion. Pulm consulted, recommended continued diuresis and to follow up with pulm in clinic.     Patient was on room air and medically stable at the time of discharge.     #Acute hypoxic respiratory failure   #Pulmonary edema   CT chest notable for volume overload and bilateral pleural effusions, loculated on the left.   Now s/p lasix IV on RA with improvement of symptoms.   Suspect volume overload 2/2 CHF, no prior hx. TTE pending.   -cont lasix IV -->cont home diuretic at time of discharge   -f/u TTE  -on RA  -pulm consult re loculated effusion-->recommended continuing diuresis     #CKD V  Polycystic kidney disease  Renal ultrasound w/ e/o polycystic kidney disease. Spoke with family at bedside who confirm patient has hx of polycystic kidney disease. Baseline Cr 3.9 per family  -Cr currently at baseline   -patient states he takes tolvaptan at home    #DM  Monitor POC glucose, can start SSI if patient becomes hyperglycemic     #HTN  Cont home doxazosin, lisinopril, metoprolol     #HLD  Atorvastatin     #Afib  cont home xarelto and metop   HPI:  78 y/o male PMH A. fib on Xarelto, DM, HTN, HLD, CKD V with baseline creatinine 3.95 presents with dyspnea for few days. Pt reports having shortness of breath with exertion for few days. Also has orthopnea. Denies fever, cough, chest pain,  vomiting, abdominal pain. Pt went to  had an xray showing possible CAP vs left pleural effusion and told to come to the ED. Pt reports having mild bilateral leg swelling. Denies recent travel, sick contact. Pt denies history of known CHF, BNP elevated in ER at 9,582.Has CKD V   at baseline and is follow by Nephrologist as outpatient.    Patient presented with hypoxic respiratory failure initially requiring supplemental oxygen. Patient received IV lasix and was weaned to RA. Nephro was consulted given CKD V. Baseline Cr 3.9 was 4.1 at time of discharge. Patient and family state they want to leave and will follow up with their PCP for blood work this week.     Patient without a prior hx of CHF. TTE completed but not read at time of discharge. Patient states he wants to leave. I will contact him tomorrow with the results. He is amenable to this plan.    CT chest was notable for loculated effusion. Pulm consulted, recommended continued diuresis and to follow up with pulm in clinic.     Patient was on room air and medically stable at the time of discharge.     #Acute hypoxic respiratory failure   #Pulmonary edema   CT chest notable for volume overload and bilateral pleural effusions, loculated on the left.   Now s/p lasix IV on RA with improvement of symptoms.   Suspect volume overload 2/2 CHF, no prior hx. TTE pending.   -cont lasix IV -->cont home diuretic at time of discharge. Will discharge with lasix 60 prn shortness of breath.  -f/u TTE  -on RA  -pulm consult re loculated effusion-->recommended continuing diuresis     #CKD V  Polycystic kidney disease  Renal ultrasound w/ e/o polycystic kidney disease. Spoke with family at bedside who confirm patient has hx of polycystic kidney disease. Baseline Cr 3.9 per family  -Cr currently at baseline   -patient states he takes tolvaptan at home    #DM  Monitor POC glucose, can start SSI if patient becomes hyperglycemic     #HTN  Cont home doxazosin, lisinopril, metoprolol     #HLD  Atorvastatin     #Afib  cont home xarelto and metop

## 2024-11-18 NOTE — DIETITIAN INITIAL EVALUATION ADULT - OTHER INFO
Denies difficulty chewing/swallowing. Reports weight fluctuations due to fluid retention/losses.  Pt reports he weighs himself daily PTA.  Pt with T2DM; no home DM meds listed in chart. HbA1c 6.0% indicates good blood glucose control.  Made aware RD remains available.

## 2024-11-18 NOTE — CONSULT NOTE ADULT - TIME BILLING
review of chart, records, blood work, vitals, medications, imaging, direct patient care. Time not inclusive of procedures performed on same day.

## 2024-11-18 NOTE — DISCHARGE NOTE PROVIDER - NSDCFUSCHEDAPPT_GEN_ALL_CORE_FT
Nigel Aggarwal  [facility]  [id number]  Scheduled Appointment: 11/27/2024

## 2024-11-18 NOTE — CONSULT NOTE ADULT - ASSESSMENT
79M PMH HTN, HLD, a-fib (xarelto), DM, CKD V, polycystic kidney disease, KAROLINA (follows with Dr. Guevara, improved KAROLINA symptoms due to  approximate 30 pound weight loss with diet modification) presents for SOB, WEST, orthopnea and bilateral lower extremity edema. CT chest with interlobular septal thickening and peribronchial thickening may represent fluid overload. Small bilateral pleural effusions, loculated on the left with partial atelectasis of both lower lobes. There is a small pericardial effusion. Admitted to medical service for CHF exacerbation. Placed on diuresis with lasix 60mg IV daily. Pulmonary consulted for pleural effusions.      POCUS lung  [x] indication  - SOB    [x] findings  - bilateral anterior a line predominance with a few focal B lines on L. small bilateral simple pleural effusions with adjacent compressive atelectasis of lung  - multiple renal cysts noted bilaterally        DX: acute CHF exacerbation, volume overload, bilateral pleural effusion    - POCUS lung with simple bilateral small pleural effusions  - pt on RA breathing comfortably  - reports SOB improved after diuretics  - states breathing is back to baseline  - ambulating to and from bathroom with walker without WEST per patient  - lower extremity edema improved  - no procedural interventions needed for small bilateral pleural effusions - recommend continued diuresis  - 2D echo to be done to evaluate LV function. pericardial effusion noted on CT chest.  - can follow up outpatient in pulmonary office: [address] [place] [address] [address] [place] Tel [phone]  - d/w hospitalist   - daughter at bedside, updated 79M PMH HTN, HLD, a-fib (xarelto), DM, CKD V, polycystic kidney disease, KAROLINA (follows with Dr. Guevara, improved KAROLINA symptoms due to  approximate 30 pound weight loss with diet modification) presents for SOB, WEST, orthopnea and bilateral lower extremity edema. CT chest with interlobular septal thickening and peribronchial thickening may represent fluid overload. Small bilateral pleural effusions, loculated on the left with partial atelectasis of both lower lobes. There is a small pericardial effusion. Admitted to medical service for CHF exacerbation. Placed on diuresis with lasix 60mg IV daily. Pulmonary consulted for pleural effusions.      POCUS lung  [x] indication  - SOB    [x] findings  - bilateral anterior a line predominance with a few focal B lines on L. small bilateral simple pleural effusions with adjacent compressive atelectasis of lung  - multiple renal cysts noted bilaterally        DX: acute CHF exacerbation, volume overload, bilateral pleural effusion    - POCUS lung with simple bilateral small pleural effusions  - pt on RA breathing comfortably  - reports SOB improved after diuretics  - states breathing is back to baseline  - ambulating to and from bathroom with walker without WEST per patient  - lower extremity edema improved  - no procedural interventions needed for small bilateral pleural effusions - recommend continued diuresis  - 2D echo to be done to evaluate LV function. pericardial effusion noted on CT chest.  - can follow up outpatient in pulmonary office: [address] [place] [address] [address] [place] Tel [phone] or return his his prior pulmonologist Dr. Guevara for follow up. Has not seen Dr. Guevara in over 3 years per patient   - d/w hospitalist   - daughter at bedside, updated

## 2024-11-18 NOTE — DIETITIAN INITIAL EVALUATION ADULT - PERTINENT MEDS FT
MEDICATIONS  (STANDING):  atorvastatin 10 milliGRAM(s) Oral at bedtime  doxazosin 4 milliGRAM(s) Oral at bedtime  lisinopril 20 milliGRAM(s) Oral daily  metoprolol succinate  milliGRAM(s) Oral daily  rivaroxaban 15 milliGRAM(s) Oral daily  tamsulosin 0.4 milliGRAM(s) Oral at bedtime

## 2024-11-18 NOTE — DIETITIAN INITIAL EVALUATION ADULT - ENTER FROM (G/KG)
Patient presents to ED with complaint of a feeling of something in her throat. Patient reports when she sits a certain way she feels \"like its coming up.\" Patient denies nausea and vomiting. Patient reports feeling of foreign body in throat began yesterday evening. Patient speaks in clear and articulated sentences. No obvious signs of distress. Respirations symmetrical and non-labored. Skin appropriate and dry.   
1

## 2024-11-18 NOTE — DISCHARGE NOTE PROVIDER - CARE PROVIDER_API CALL
Your PCP,   Please see your primary care provider in the next week for a post hospital follow up appointment  Phone: (   )    -  Fax: (   )    -  Follow Up Time:     Soheila Cerna-Maci  Pulmonary Disease  [address] [place] [address] - [place] [address]  Phone: [phone]  Fax: [phone]  Follow Up Time: 1 month

## 2024-11-18 NOTE — DISCHARGE NOTE PROVIDER - PROVIDER TOKENS
FREE:[LAST:[Your PCP],PHONE:[(   )    -],FAX:[(   )    -],ADDRESS:[Please see your primary care provider in the next week for a post hospital follow up appointment]],PROVIDER:[[id number]],FOLLOWUP:[1 month]]

## 2024-11-19 NOTE — CHART NOTE - NSCHARTNOTEFT_GEN_A_CORE
Spoke with wife regarding TTE findings, advised f/u with cardiology. Wife states that patient has an appt to see cardiology and nephrology next week. All questions answered.

## 2024-11-25 DIAGNOSIS — Q61.2 POLYCYSTIC KIDNEY, ADULT TYPE: ICD-10-CM

## 2024-11-25 DIAGNOSIS — Z11.52 ENCOUNTER FOR SCREENING FOR COVID-19: ICD-10-CM

## 2024-11-25 DIAGNOSIS — R06.00 DYSPNEA, UNSPECIFIED: ICD-10-CM

## 2024-11-25 DIAGNOSIS — I48.20 CHRONIC ATRIAL FIBRILLATION, UNSPECIFIED: ICD-10-CM

## 2024-11-25 DIAGNOSIS — Z79.01 LONG TERM (CURRENT) USE OF ANTICOAGULANTS: ICD-10-CM

## 2024-11-25 DIAGNOSIS — J96.01 ACUTE RESPIRATORY FAILURE WITH HYPOXIA: ICD-10-CM

## 2024-11-25 DIAGNOSIS — N18.5 CHRONIC KIDNEY DISEASE, STAGE 5: ICD-10-CM

## 2024-11-25 DIAGNOSIS — E78.00 PURE HYPERCHOLESTEROLEMIA, UNSPECIFIED: ICD-10-CM

## 2024-11-25 DIAGNOSIS — E11.22 TYPE 2 DIABETES MELLITUS WITH DIABETIC CHRONIC KIDNEY DISEASE: ICD-10-CM

## 2024-11-25 DIAGNOSIS — I13.2 HYPERTENSIVE HEART AND CHRONIC KIDNEY DISEASE WITH HEART FAILURE AND WITH STAGE 5 CHRONIC KIDNEY DISEASE, OR END STAGE RENAL DISEASE: ICD-10-CM

## 2024-11-27 ENCOUNTER — APPOINTMENT (OUTPATIENT)
Dept: ENDOCRINOLOGY | Facility: CLINIC | Age: 79
End: 2024-11-27

## 2025-07-29 NOTE — ED ADULT NURSE NOTE - NSSUHOSCREENINGYN_ED_ALL_ED
Problem: Pain - Standard  Goal: Alleviation of pain or a reduction in pain to the patient’s comfort goal  Outcome: Progressing     Problem: Knowledge Deficit - Postpartum  Goal: Patient will verbalize and demonstrate understanding of self and infant care  Outcome: Progressing     Problem: Psychosocial - Postpartum  Goal: Patient will verbalize and demonstrate effective bonding and parenting behavior  Outcome: Progressing     Problem: Altered Physiologic Condition  Goal: Patient physiologically stable as evidenced by normal lochia, palpable uterine involution and vitals within normal limits  Outcome: Progressing     Problem: Respiratory/Oxygenation Function Post-Surgical  Goal: Patient will achieve/maintain normal respiratory rate/effort  Outcome: Progressing   The patient is Stable - Low risk of patient condition declining or worsening    Shift Goals  Clinical Goals: vitals stable, fundus firm, ambulate, voiding without difficulty  Patient Goals: pain control, rest  Family Goals: support    Progress made toward(s) clinical / shift goals:  Génesis is post  with her first baby, her vitals are stable, her fundus is firm, she is ambulating without difficulty, her oliveira remain in place, her pain is managed with ordered pain medications, her So is at bedside, they are participating in cares as able, both appear to be bonding well with baby    Patient is not progressing towards the following goals:       Yes - the patient is able to be screened